# Patient Record
Sex: FEMALE | Race: WHITE | HISPANIC OR LATINO | Employment: FULL TIME | URBAN - METROPOLITAN AREA
[De-identification: names, ages, dates, MRNs, and addresses within clinical notes are randomized per-mention and may not be internally consistent; named-entity substitution may affect disease eponyms.]

---

## 2020-12-04 PROBLEM — Z20.822 SUSPECTED COVID-19 VIRUS INFECTION: Status: ACTIVE | Noted: 2020-12-04

## 2020-12-04 PROBLEM — Z20.822 SUSPECTED COVID-19 VIRUS INFECTION: Status: RESOLVED | Noted: 2020-12-04 | Resolved: 2020-12-04

## 2020-12-04 PROBLEM — R53.83 FATIGUE: Status: ACTIVE | Noted: 2020-12-04

## 2020-12-04 PROBLEM — R11.10 NON-INTRACTABLE VOMITING: Status: ACTIVE | Noted: 2020-12-04

## 2020-12-04 PROBLEM — R05.9 COUGH: Status: ACTIVE | Noted: 2020-12-04

## 2020-12-04 PROBLEM — E86.1 HYPOVOLEMIA: Status: ACTIVE | Noted: 2020-12-04

## 2020-12-04 PROBLEM — R51.9 NEW ONSET OF HEADACHES: Status: ACTIVE | Noted: 2020-12-04

## 2020-12-05 ENCOUNTER — HOSPITAL ENCOUNTER (OUTPATIENT)
Dept: RADIOLOGY | Facility: MEDICAL CENTER | Age: 30
End: 2020-12-05
Payer: COMMERCIAL

## 2020-12-05 ENCOUNTER — APPOINTMENT (OUTPATIENT)
Dept: RADIOLOGY | Facility: MEDICAL CENTER | Age: 30
DRG: 443 | End: 2020-12-05
Attending: HOSPITALIST
Payer: COMMERCIAL

## 2020-12-05 ENCOUNTER — HOSPITAL ENCOUNTER (INPATIENT)
Facility: MEDICAL CENTER | Age: 30
LOS: 4 days | DRG: 443 | End: 2020-12-09
Attending: EMERGENCY MEDICINE | Admitting: HOSPITALIST
Payer: COMMERCIAL

## 2020-12-05 DIAGNOSIS — K83.09 ASCENDING CHOLANGITIS: ICD-10-CM

## 2020-12-05 PROBLEM — K81.9 CHOLECYSTITIS: Status: ACTIVE | Noted: 2020-12-05

## 2020-12-05 PROBLEM — R74.01 TRANSAMINITIS: Status: ACTIVE | Noted: 2020-12-05

## 2020-12-05 PROBLEM — B15.9: Status: ACTIVE | Noted: 2020-12-05

## 2020-12-05 LAB
ALBUMIN SERPL BCP-MCNC: 3.1 G/DL (ref 3.2–4.9)
ALBUMIN/GLOB SERPL: 0.8 G/DL
ALP SERPL-CCNC: 115 U/L (ref 30–99)
ALT SERPL-CCNC: 1625 U/L (ref 2–50)
ANION GAP SERPL CALC-SCNC: 9 MMOL/L (ref 7–16)
AST SERPL-CCNC: 1842 U/L (ref 12–45)
BASOPHILS # BLD AUTO: 0.9 % (ref 0–1.8)
BASOPHILS # BLD: 0.04 K/UL (ref 0–0.12)
BILIRUB SERPL-MCNC: 6.3 MG/DL (ref 0.1–1.5)
BUN SERPL-MCNC: 7 MG/DL (ref 8–22)
CALCIUM SERPL-MCNC: 8.3 MG/DL (ref 8.5–10.5)
CHLORIDE SERPL-SCNC: 105 MMOL/L (ref 96–112)
CO2 SERPL-SCNC: 22 MMOL/L (ref 20–33)
CREAT SERPL-MCNC: 0.44 MG/DL (ref 0.5–1.4)
EOSINOPHIL # BLD AUTO: 0 K/UL (ref 0–0.51)
EOSINOPHIL NFR BLD: 0 % (ref 0–6.9)
ERYTHROCYTE [DISTWIDTH] IN BLOOD BY AUTOMATED COUNT: 45 FL (ref 35.9–50)
GLOBULIN SER CALC-MCNC: 3.9 G/DL (ref 1.9–3.5)
GLUCOSE SERPL-MCNC: 76 MG/DL (ref 65–99)
HCG SERPL QL: NEGATIVE
HCT VFR BLD AUTO: 38.3 % (ref 37–47)
HGB BLD-MCNC: 13.3 G/DL (ref 12–16)
LIPASE SERPL-CCNC: 13 U/L (ref 11–82)
LYMPHOCYTES # BLD AUTO: 0.84 K/UL (ref 1–4.8)
LYMPHOCYTES NFR BLD: 17.4 % (ref 22–41)
MANUAL DIFF BLD: NORMAL
MCH RBC QN AUTO: 30.9 PG (ref 27–33)
MCHC RBC AUTO-ENTMCNC: 34.7 G/DL (ref 33.6–35)
MCV RBC AUTO: 88.9 FL (ref 81.4–97.8)
MONOCYTES # BLD AUTO: 0.62 K/UL (ref 0–0.85)
MONOCYTES NFR BLD AUTO: 13 % (ref 0–13.4)
MORPHOLOGY BLD-IMP: NORMAL
NEUTROPHILS # BLD AUTO: 3.3 K/UL (ref 2–7.15)
NEUTROPHILS NFR BLD: 68.7 % (ref 44–72)
NRBC # BLD AUTO: 0 K/UL
NRBC BLD-RTO: 0 /100 WBC
PLATELET # BLD AUTO: 249 K/UL (ref 164–446)
PLATELET BLD QL SMEAR: NORMAL
PMV BLD AUTO: 9.3 FL (ref 9–12.9)
POTASSIUM SERPL-SCNC: 3.6 MMOL/L (ref 3.6–5.5)
PROT SERPL-MCNC: 7 G/DL (ref 6–8.2)
RBC # BLD AUTO: 4.31 M/UL (ref 4.2–5.4)
RBC BLD AUTO: NORMAL
SODIUM SERPL-SCNC: 136 MMOL/L (ref 135–145)
WBC # BLD AUTO: 4.8 K/UL (ref 4.8–10.8)

## 2020-12-05 PROCEDURE — 700105 HCHG RX REV CODE 258: Performed by: HOSPITALIST

## 2020-12-05 PROCEDURE — 84703 CHORIONIC GONADOTROPIN ASSAY: CPT

## 2020-12-05 PROCEDURE — 770006 HCHG ROOM/CARE - MED/SURG/GYN SEMI*

## 2020-12-05 PROCEDURE — 700111 HCHG RX REV CODE 636 W/ 250 OVERRIDE (IP): Performed by: HOSPITALIST

## 2020-12-05 PROCEDURE — 85027 COMPLETE CBC AUTOMATED: CPT

## 2020-12-05 PROCEDURE — U0003 INFECTIOUS AGENT DETECTION BY NUCLEIC ACID (DNA OR RNA); SEVERE ACUTE RESPIRATORY SYNDROME CORONAVIRUS 2 (SARS-COV-2) (CORONAVIRUS DISEASE [COVID-19]), AMPLIFIED PROBE TECHNIQUE, MAKING USE OF HIGH THROUGHPUT TECHNOLOGIES AS DESCRIBED BY CMS-2020-01-R: HCPCS

## 2020-12-05 PROCEDURE — 80053 COMPREHEN METABOLIC PANEL: CPT

## 2020-12-05 PROCEDURE — 83690 ASSAY OF LIPASE: CPT

## 2020-12-05 PROCEDURE — 96365 THER/PROPH/DIAG IV INF INIT: CPT

## 2020-12-05 PROCEDURE — 85007 BL SMEAR W/DIFF WBC COUNT: CPT

## 2020-12-05 PROCEDURE — 99223 1ST HOSP IP/OBS HIGH 75: CPT | Performed by: HOSPITALIST

## 2020-12-05 PROCEDURE — 99285 EMERGENCY DEPT VISIT HI MDM: CPT

## 2020-12-05 RX ORDER — AMOXICILLIN 250 MG
2 CAPSULE ORAL 2 TIMES DAILY
Status: DISCONTINUED | OUTPATIENT
Start: 2020-12-05 | End: 2020-12-09 | Stop reason: HOSPADM

## 2020-12-05 RX ORDER — SENNOSIDES 8.6 MG
650 CAPSULE ORAL EVERY 6 HOURS PRN
Status: ON HOLD | COMMUNITY
End: 2020-12-09

## 2020-12-05 RX ORDER — BISACODYL 10 MG
10 SUPPOSITORY, RECTAL RECTAL
Status: DISCONTINUED | OUTPATIENT
Start: 2020-12-05 | End: 2020-12-09 | Stop reason: HOSPADM

## 2020-12-05 RX ORDER — OXYCODONE HYDROCHLORIDE 5 MG/1
5 TABLET ORAL
Status: DISCONTINUED | OUTPATIENT
Start: 2020-12-05 | End: 2020-12-09 | Stop reason: HOSPADM

## 2020-12-05 RX ORDER — SODIUM CHLORIDE 9 MG/ML
INJECTION, SOLUTION INTRAVENOUS CONTINUOUS
Status: DISCONTINUED | OUTPATIENT
Start: 2020-12-05 | End: 2020-12-07

## 2020-12-05 RX ORDER — OXYCODONE HYDROCHLORIDE 10 MG/1
10 TABLET ORAL
Status: DISCONTINUED | OUTPATIENT
Start: 2020-12-05 | End: 2020-12-09 | Stop reason: HOSPADM

## 2020-12-05 RX ORDER — POLYETHYLENE GLYCOL 3350 17 G/17G
1 POWDER, FOR SOLUTION ORAL
Status: DISCONTINUED | OUTPATIENT
Start: 2020-12-05 | End: 2020-12-09 | Stop reason: HOSPADM

## 2020-12-05 RX ORDER — MORPHINE SULFATE 4 MG/ML
4 INJECTION, SOLUTION INTRAMUSCULAR; INTRAVENOUS
Status: DISCONTINUED | OUTPATIENT
Start: 2020-12-05 | End: 2020-12-09 | Stop reason: HOSPADM

## 2020-12-05 RX ORDER — IBUPROFEN 200 MG
800 TABLET ORAL
Status: ON HOLD | COMMUNITY
End: 2020-12-09

## 2020-12-05 RX ADMIN — PIPERACILLIN SODIUM, TAZOBACTAM SODIUM 3.38 G: 3; .375 INJECTION, POWDER, LYOPHILIZED, FOR SOLUTION INTRAVENOUS at 22:51

## 2020-12-05 ASSESSMENT — LIFESTYLE VARIABLES: DO YOU DRINK ALCOHOL: NO

## 2020-12-06 PROBLEM — U07.1 COVID-19 DETERMINED BY CLINICAL DIAGNOSTIC CRITERIA: Status: ACTIVE | Noted: 2020-12-06

## 2020-12-06 PROBLEM — K82.8 THICKENING OF WALL OF GALLBLADDER: Status: ACTIVE | Noted: 2020-12-05

## 2020-12-06 LAB
ALBUMIN SERPL BCP-MCNC: 3.1 G/DL (ref 3.2–4.9)
ALBUMIN/GLOB SERPL: 0.8 G/DL
ALP SERPL-CCNC: 113 U/L (ref 30–99)
ALT SERPL-CCNC: 1795 U/L (ref 2–50)
ANION GAP SERPL CALC-SCNC: 8 MMOL/L (ref 7–16)
ANISOCYTOSIS BLD QL SMEAR: ABNORMAL
AST SERPL-CCNC: 2262 U/L (ref 12–45)
BASOPHILS # BLD AUTO: 0 % (ref 0–1.8)
BASOPHILS # BLD: 0 K/UL (ref 0–0.12)
BILIRUB SERPL-MCNC: 6.6 MG/DL (ref 0.1–1.5)
BUN SERPL-MCNC: 8 MG/DL (ref 8–22)
BURR CELLS BLD QL SMEAR: NORMAL
CALCIUM SERPL-MCNC: 8.5 MG/DL (ref 8.5–10.5)
CHLORIDE SERPL-SCNC: 105 MMOL/L (ref 96–112)
CHOLEST SERPL-MCNC: 68 MG/DL (ref 100–199)
CO2 SERPL-SCNC: 23 MMOL/L (ref 20–33)
COVID ORDER STATUS COVID19: NORMAL
CREAT SERPL-MCNC: 0.44 MG/DL (ref 0.5–1.4)
EOSINOPHIL # BLD AUTO: 0 K/UL (ref 0–0.51)
EOSINOPHIL NFR BLD: 0 % (ref 0–6.9)
ERYTHROCYTE [DISTWIDTH] IN BLOOD BY AUTOMATED COUNT: 45.4 FL (ref 35.9–50)
FERRITIN SERPL-MCNC: 923 NG/ML (ref 10–291)
GLOBULIN SER CALC-MCNC: 3.8 G/DL (ref 1.9–3.5)
GLUCOSE SERPL-MCNC: 66 MG/DL (ref 65–99)
HAV IGM SERPL QL IA: REACTIVE
HCT VFR BLD AUTO: 39.1 % (ref 37–47)
HDLC SERPL-MCNC: 10 MG/DL
HGB BLD-MCNC: 13.5 G/DL (ref 12–16)
IRON SATN MFR SERPL: 23 % (ref 15–55)
IRON SERPL-MCNC: 49 UG/DL (ref 40–170)
LDLC SERPL CALC-MCNC: 36 MG/DL
LYMPHOCYTES # BLD AUTO: 0.74 K/UL (ref 1–4.8)
LYMPHOCYTES NFR BLD: 15.7 % (ref 22–41)
MACROCYTES BLD QL SMEAR: ABNORMAL
MAGNESIUM SERPL-MCNC: 1.9 MG/DL (ref 1.5–2.5)
MANUAL DIFF BLD: NORMAL
MCH RBC QN AUTO: 30.8 PG (ref 27–33)
MCHC RBC AUTO-ENTMCNC: 34.5 G/DL (ref 33.6–35)
MCV RBC AUTO: 89.1 FL (ref 81.4–97.8)
MONOCYTES # BLD AUTO: 0.41 K/UL (ref 0–0.85)
MONOCYTES NFR BLD AUTO: 8.7 % (ref 0–13.4)
MORPHOLOGY BLD-IMP: NORMAL
NEUTROPHILS # BLD AUTO: 3.56 K/UL (ref 2–7.15)
NEUTROPHILS NFR BLD: 74.8 % (ref 44–72)
NEUTS BAND NFR BLD MANUAL: 0.9 % (ref 0–10)
NRBC # BLD AUTO: 0 K/UL
NRBC BLD-RTO: 0 /100 WBC
PLATELET # BLD AUTO: 249 K/UL (ref 164–446)
PLATELET BLD QL SMEAR: NORMAL
PMV BLD AUTO: 9.2 FL (ref 9–12.9)
POIKILOCYTOSIS BLD QL SMEAR: NORMAL
POTASSIUM SERPL-SCNC: 3.8 MMOL/L (ref 3.6–5.5)
PROT SERPL-MCNC: 6.9 G/DL (ref 6–8.2)
RBC # BLD AUTO: 4.39 M/UL (ref 4.2–5.4)
RBC BLD AUTO: PRESENT
SARS-COV-2 RNA RESP QL NAA+PROBE: NOTDETECTED
SODIUM SERPL-SCNC: 136 MMOL/L (ref 135–145)
SPECIMEN SOURCE: NORMAL
TIBC SERPL-MCNC: 210 UG/DL (ref 250–450)
TRIGL SERPL-MCNC: 109 MG/DL (ref 0–149)
UIBC SERPL-MCNC: 161 UG/DL (ref 110–370)
WBC # BLD AUTO: 4.7 K/UL (ref 4.8–10.8)

## 2020-12-06 PROCEDURE — 80061 LIPID PANEL: CPT

## 2020-12-06 PROCEDURE — 700102 HCHG RX REV CODE 250 W/ 637 OVERRIDE(OP): Performed by: HOSPITALIST

## 2020-12-06 PROCEDURE — 86709 HEPATITIS A IGM ANTIBODY: CPT

## 2020-12-06 PROCEDURE — 80053 COMPREHEN METABOLIC PANEL: CPT

## 2020-12-06 PROCEDURE — 85007 BL SMEAR W/DIFF WBC COUNT: CPT

## 2020-12-06 PROCEDURE — 700105 HCHG RX REV CODE 258: Performed by: HOSPITALIST

## 2020-12-06 PROCEDURE — 36415 COLL VENOUS BLD VENIPUNCTURE: CPT

## 2020-12-06 PROCEDURE — 74181 MRI ABDOMEN W/O CONTRAST: CPT

## 2020-12-06 PROCEDURE — 83540 ASSAY OF IRON: CPT

## 2020-12-06 PROCEDURE — 83516 IMMUNOASSAY NONANTIBODY: CPT

## 2020-12-06 PROCEDURE — 700111 HCHG RX REV CODE 636 W/ 250 OVERRIDE (IP): Performed by: HOSPITALIST

## 2020-12-06 PROCEDURE — 83550 IRON BINDING TEST: CPT

## 2020-12-06 PROCEDURE — 85027 COMPLETE CBC AUTOMATED: CPT

## 2020-12-06 PROCEDURE — 83735 ASSAY OF MAGNESIUM: CPT

## 2020-12-06 PROCEDURE — 770021 HCHG ROOM/CARE - ISO PRIVATE

## 2020-12-06 PROCEDURE — 99233 SBSQ HOSP IP/OBS HIGH 50: CPT | Performed by: STUDENT IN AN ORGANIZED HEALTH CARE EDUCATION/TRAINING PROGRAM

## 2020-12-06 PROCEDURE — 82728 ASSAY OF FERRITIN: CPT

## 2020-12-06 PROCEDURE — A9270 NON-COVERED ITEM OR SERVICE: HCPCS | Performed by: HOSPITALIST

## 2020-12-06 PROCEDURE — 86039 ANTINUCLEAR ANTIBODIES (ANA): CPT

## 2020-12-06 RX ADMIN — PIPERACILLIN SODIUM, TAZOBACTAM SODIUM 3.38 G: 3; .375 INJECTION, POWDER, LYOPHILIZED, FOR SOLUTION INTRAVENOUS at 14:59

## 2020-12-06 RX ADMIN — SODIUM CHLORIDE: 9 INJECTION, SOLUTION INTRAVENOUS at 09:40

## 2020-12-06 RX ADMIN — DOCUSATE SODIUM 50 MG AND SENNOSIDES 8.6 MG 2 TABLET: 8.6; 5 TABLET, FILM COATED ORAL at 05:15

## 2020-12-06 RX ADMIN — SODIUM CHLORIDE: 9 INJECTION, SOLUTION INTRAVENOUS at 00:20

## 2020-12-06 RX ADMIN — PIPERACILLIN SODIUM, TAZOBACTAM SODIUM 3.38 G: 3; .375 INJECTION, POWDER, LYOPHILIZED, FOR SOLUTION INTRAVENOUS at 21:07

## 2020-12-06 RX ADMIN — SODIUM CHLORIDE: 9 INJECTION, SOLUTION INTRAVENOUS at 19:05

## 2020-12-06 RX ADMIN — PIPERACILLIN SODIUM, TAZOBACTAM SODIUM 3.38 G: 3; .375 INJECTION, POWDER, LYOPHILIZED, FOR SOLUTION INTRAVENOUS at 05:15

## 2020-12-06 SDOH — ECONOMIC STABILITY: FOOD INSECURITY: WITHIN THE PAST 12 MONTHS, THE FOOD YOU BOUGHT JUST DIDN'T LAST AND YOU DIDN'T HAVE MONEY TO GET MORE.: NEVER TRUE

## 2020-12-06 SDOH — HEALTH STABILITY: MENTAL HEALTH: HOW MANY STANDARD DRINKS CONTAINING ALCOHOL DO YOU HAVE ON A TYPICAL DAY?: 1 OR 2

## 2020-12-06 SDOH — HEALTH STABILITY: MENTAL HEALTH: HOW OFTEN DO YOU HAVE A DRINK CONTAINING ALCOHOL?: MONTHLY OR LESS

## 2020-12-06 SDOH — ECONOMIC STABILITY: FOOD INSECURITY: WITHIN THE PAST 12 MONTHS, YOU WORRIED THAT YOUR FOOD WOULD RUN OUT BEFORE YOU GOT MONEY TO BUY MORE.: NEVER TRUE

## 2020-12-06 SDOH — ECONOMIC STABILITY: TRANSPORTATION INSECURITY
IN THE PAST 12 MONTHS, HAS LACK OF TRANSPORTATION KEPT YOU FROM MEETINGS, WORK, OR FROM GETTING THINGS NEEDED FOR DAILY LIVING?: NO

## 2020-12-06 SDOH — ECONOMIC STABILITY: TRANSPORTATION INSECURITY
IN THE PAST 12 MONTHS, HAS THE LACK OF TRANSPORTATION KEPT YOU FROM MEDICAL APPOINTMENTS OR FROM GETTING MEDICATIONS?: NO

## 2020-12-06 ASSESSMENT — ENCOUNTER SYMPTOMS
VOMITING: 1
ROS SKIN COMMENTS: +JAUNDICE
ABDOMINAL PAIN: 1
HEADACHES: 1
CARDIOVASCULAR NEGATIVE: 1
PSYCHIATRIC NEGATIVE: 1
NAUSEA: 1
WEAKNESS: 1
COUGH: 1
RESPIRATORY NEGATIVE: 1
MYALGIAS: 1

## 2020-12-06 ASSESSMENT — COGNITIVE AND FUNCTIONAL STATUS - GENERAL
MOBILITY SCORE: 24
SUGGESTED CMS G CODE MODIFIER MOBILITY: CH
DAILY ACTIVITIY SCORE: 24
SUGGESTED CMS G CODE MODIFIER DAILY ACTIVITY: CH

## 2020-12-06 ASSESSMENT — PATIENT HEALTH QUESTIONNAIRE - PHQ9
SUM OF ALL RESPONSES TO PHQ9 QUESTIONS 1 AND 2: 0
1. LITTLE INTEREST OR PLEASURE IN DOING THINGS: NOT AT ALL
2. FEELING DOWN, DEPRESSED, IRRITABLE, OR HOPELESS: NOT AT ALL

## 2020-12-06 ASSESSMENT — PAIN DESCRIPTION - PAIN TYPE
TYPE: ACUTE PAIN

## 2020-12-06 ASSESSMENT — LIFESTYLE VARIABLES
EVER HAD A DRINK FIRST THING IN THE MORNING TO STEADY YOUR NERVES TO GET RID OF A HANGOVER: NO
TOTAL SCORE: 0
AVERAGE NUMBER OF DAYS PER WEEK YOU HAVE A DRINK CONTAINING ALCOHOL: 0.25
DOES PATIENT WANT TO STOP DRINKING: NO
HOW MANY TIMES IN THE PAST YEAR HAVE YOU HAD 5 OR MORE DRINKS IN A DAY: 2
TOTAL SCORE: 0
ALCOHOL_USE: YES
HAVE PEOPLE ANNOYED YOU BY CRITICIZING YOUR DRINKING: NO
EVER FELT BAD OR GUILTY ABOUT YOUR DRINKING: NO
ON A TYPICAL DAY WHEN YOU DRINK ALCOHOL HOW MANY DRINKS DO YOU HAVE: 2
TOTAL SCORE: 0
HAVE YOU EVER FELT YOU SHOULD CUT DOWN ON YOUR DRINKING: NO
CONSUMPTION TOTAL: POSITIVE

## 2020-12-06 ASSESSMENT — FIBROSIS 4 INDEX: FIB4 SCORE: 5.32

## 2020-12-06 NOTE — ED NOTES
Med rec completed per pt at bedside  Allergies reviewed - NKDA  No oral ABX in last 14 days     Pt denies taking any prescription medications

## 2020-12-06 NOTE — ASSESSMENT & PLAN NOTE
Feeling ill for > 1 week  No fever / chills + N/V  Elevated LFTs and T bili  Markedly thickened gallbladder wall  IgM - reactive

## 2020-12-06 NOTE — CARE PLAN
Problem: Pain Management  Goal: Pain level will decrease to patient's comfort goal  Outcome: PROGRESSING AS EXPECTED   Denies pain    Problem: Infection  Goal: Will remain free from infection  Outcome: PROGRESSING AS EXPECTED   Pt on IV zosyn

## 2020-12-06 NOTE — PROGRESS NOTES
Jin from Lab called with critical result of AST 2262 and ALT of 1795 at 0621. Critical lab result read back to Jin.   Dr. Hair notified of critical lab result at 0633.  Critical lab result read back by Dr. Hair. No new orders received at this time.

## 2020-12-06 NOTE — PROGRESS NOTES
Received bedside report from Lisa RN  Pt AAOx4  RANDLE  VSS  Denies pain  Denies SOB  -N/V; pt NPO  +BS +Flatus Last BM 12/4  Pt on IV zosyn  Pt up self w/ steady gait  POC discussed  Bed locked and in the lowest position  Call light w/in reach  Hourly rounding in place

## 2020-12-06 NOTE — PROGRESS NOTES
Pt is A&O 4  Pain well controlled at this time   Denies nausea  Pt currently NPO for planned procedure at this time. Awaiting scheduling  + Voids  + flatus  - BM  Up SBA  Bed alarm n/a, pt no fall risk per christophe hinojosa  Reviewed plan of care with patient, bed in lowest position and locked, pt resting comfortably now, call light within reach, all needs met at this time. Interventions will be executed per plan of care

## 2020-12-06 NOTE — H&P
Hospital Medicine History & Physical Note    Date of Service  12/5/2020    Primary Care Physician  Pcp Pt States None    Consultants  MD Maria M, GI    Code Status  Full Code    Chief Complaint  Chief Complaint   Patient presents with   • Abdominal Pain     Pt bib ems from [facility]  for an ERCP procedure.        History of Presenting Illness  30 y.o. female who presented 12/5/2020 with few days of worsening nausea and vomiting accompanied by headaches and myalgias.  Patient's also had a little bit of a cough for which she was concerned her entire constellation of symptoms could be attributable to COVID-19 as it is currently pandemic.  She is actually had several negative Covid swabs over the past 2 weeks, was evaluated in her doctor's office, routine labs were obtained showing a markedly elevated transaminase for which she was sent to the local emergency department up at [facility].  There is CT scan of the abdomen revealed marked gallbladder wall thickening.  Hepatitis serologies were drawn and significant for a positive hepatitis A.  She was seen by the general surgery resident, and transferred to our facility for ERCP.  Case was discussed with on-call GI Dr. Iraj estevez, who does recommend obtaining an MRCP prior, he will be by to see the patient in the morning.  Patient denies any prior episodes.  Acuity is acute, severity is severe, location is GI, timing is irrelevant, no particular alleviating or aggravating factors, course is progressive.    Review of Systems  All systems reviewed and negative except as noted per HPI.    Past Medical History   has a past medical history of Non-intractable vomiting (12/4/2020).    Surgical History   has no past surgical history on file.     Family History  family history includes Arthritis in her mother; Cancer in her paternal aunt and paternal grandfather; Diabetes in her father; Hypertension in her father and mother.     Social History   reports that she has never  smoked. She has never used smokeless tobacco. She reports current alcohol use. She reports that she does not use drugs.  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    Allergies  No Known Allergies    Medications  Prior to Admission Medications   Prescriptions Last Dose Informant Patient Reported? Taking?   acetaminophen (TYLENOL) 650 MG CR tablet 12/3/2020 at unknown Patient Yes Yes   Sig: Take 650 mg by mouth every 6 hours as needed for Moderate Pain.   fluticasone (FLONASE) 50 MCG/ACT nasal spray NOT TAKING at NOT TAKING Patient No No   Sig: Spray 1 Spray in nose every day. 1 spray into each nostril daily x21d   Patient not taking: Reported on 12/5/2020   ibuprofen (MOTRIN) 200 MG Tab 12/2/2020 at unknown Patient Yes Yes   Sig: Take 800 mg by mouth 1 time a day as needed for Mild Pain.      Facility-Administered Medications: None       Physical Exam  Temp:  [36.9 °C (98.4 °F)] 36.9 °C (98.4 °F)  Pulse:  [] 98  Resp:  [11-20] 14  BP: (101-117)/(55-82) 101/55  SpO2:  [90 %-94 %] 93 %    General: No acute distress  HEENT atraumatic, normocephalic, pupils equal round reactive to light  Neck: No JVD  Chest: Respirations are unlabored  Cardiac: Physiologic S1 and S2  Abdomen: Soft, exquisitely tender to the right upper quadrant, nondistended  Extremities: Without clubbing, cyanosis or edema  Neuro: Cranial nerves II through XII are grossly intact.  Psych: No anxiety, judgement intact.        Laboratory:  Recent Labs     12/04/20  1125 12/05/20  1213   WBC 5.2 4.5*   RBC 4.84 4.74   HEMOGLOBIN 14.8 14.5   HEMATOCRIT 42.5 41.6   MCV 87.8 87.8   MCH 30.6 30.6   MCHC 34.8 34.9   RDW 13.5 13.8   PLATELETCT 218 246   MPV 10.0 9.4     Recent Labs     12/04/20  1125 12/05/20  1213   SODIUM 138 137   POTASSIUM 3.7 3.6   CHLORIDE 102 102   CO2 23 25   GLUCOSE 105* 98   BUN 9 8   CREATININE 0.6 0.6   CALCIUM 9.1 9.2     Recent Labs     12/04/20  1125 12/05/20  1213   ALTSGPT 1699* 1904*   NEIDA 2012* 2354*    ALKPHOSPHAT 172* 156*   TBILIRUBIN 6.2* 6.5*   DBILIRUBIN  --  4.8*   LIPASE  --  26   GLUCOSE 105* 98     Recent Labs     12/05/20  1213   INR 1.23     No results for input(s): NTPROBNP in the last 72 hours.      No results for input(s): TROPONINT in the last 72 hours.    Imaging:  US-FOREIGN FILM ULTRASOUND   Final Result   Gallbladder wall was so diffusely thickened that it could not be initially definitively identified on ultrasound.  Presence confirmed only after CT scan per below.     OUTSIDE IMAGES-CT ABDOMEN /PELVIS   Final Result   Markedly thickened gallbladder wall.  Diffuse acute cholecystitis.  MRCP may provide additional information.    Images personally reviewed, sample image above.     [id number]    (Results Pending)   Ordered, results pending.      Assessment/Plan:  I anticipate this patient will require at least two midnights for appropriate medical management, necessitating inpatient admission.    Cholecystitis  Assessment & Plan  Patient will require an ERCP.  Dr. Graves [facility] contacted 12/5, 2100.  He requests MRCP, will see the patient in the AM.  Differential diagnosis does include an ascending cholangitis.  Patient will be permitted full liquid diet now, n.p.o. at midnight, IV hydration, antibiotics with Zosyn, she received a loading dose at [facility], will receive the extended infusion protocol here at [facility].    Transaminitis  Assessment & Plan  Likely due to hep A versus cholecystitis.  GI consult for consideration of ERCP    Hepatitis A test positive  Assessment & Plan  This may be playing a role in her cholecystitis.  Unclear if this is acute versus chronic.

## 2020-12-06 NOTE — CONSULTS
Gastroenterology Consult Note:    NILA Miller  Date & Time note created:    12/6/2020   9:08 AM     Referring MD:  Dr. Shan Reyes MD    Patient ID:  Name:             Krystal Edwards     YOB: 1990  Age:                 30 y.o.  female   MRN:               [id number]                                                             Reason for Consult:      Elevated LFT's  Nausea and vomiting x 9 days    History of Present Illness:    This is a 30-year-old female, with no significant past medical history, who presented to the emergency room with worsening nausea/vomiting, myalgias, headaches for the past 9 days.  The nausea and vomiting exacerbates after eating.  Also, she experienced bilateral knee and hip pain at the onset of symptoms.  She was concerned about Covid-which was negative.  Her PCP ran labs showing markedly elevated transaminase.  Evaluated at [facility]-CT scan of the abdomen revealed marked gallbladder wall thickening.  While there, she was evaluated by a general surgeon resident-who told her to come to [facility] for ERCP.  [facility] ER labs-CBC WNL, total bilirubin 6.2.  AST 2012, ALT 1699, alkaline phosphatase 172, lactic acid 1.3.  Hepatitis A positive. Hepatitis B and C-negative.  Infectious disease panel-negative.  Tylenol toxicity- <10.    Abdominal ultrasound 12/5/2020-gallbladder not visualized.  CBD 2 mm.    CT scan abdomen/pelvis 12/5/2020-markedly thickened gallbladder wall.  Diffuse acute cholecystitis.    MRCP 12/6/2020-severe edematous gallbladder with gallbladder wall thickening and collapsing of the lumen of unclear etiology.  No gallstone identified.  Normal CBD.  No CBD stone identified.    Hepatitis A reactive.  She denies any recent travel.  She went to [place] 1 year ago.  No changes in diet.  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    Denies any changes in medications.  No  "Tylenol use.  Alcohol 1-2 drinks per month.  Never heavy use.  No drug use.  Family members-strong history of gallbladder disease- 3 sisters and 1 brother.  No family history of liver disease.    Review of Systems:      Review of Systems   Constitutional: Positive for malaise/fatigue.   HENT: Negative.    Eyes:        +icteric   Respiratory: Negative.    Cardiovascular: Negative.    Gastrointestinal: Positive for abdominal pain, nausea and vomiting.   Genitourinary: Negative.    Musculoskeletal: Positive for joint pain and myalgias.   Skin: Negative for itching.        +jaundice   Neurological: Positive for weakness.   Psychiatric/Behavioral: Negative.              Physical Exam:  Vitals/ General Appearance:   Weight/BMI: Body mass index is 22.13 kg/m².    Vitals:    12/05/20 2251 12/06/20 0020 12/06/20 0036 12/06/20 0450   BP:  100/62  (!) 99/58   Pulse: (!) 104 93  95   Resp: 20 16  16   Temp:  36.6 °C (97.8 °F)  37.1 °C (98.8 °F)   TempSrc:  Temporal  Temporal   SpO2: 94% 93%  94%   Weight:   54.9 kg (121 lb)    Height:   1.575 m (5' 2\")      Oxygen Therapy:  Pulse Oximetry: 94 %, O2 (LPM): 0, O2 Delivery Device: None - Room Air    Physical Exam   Constitutional: She is oriented to person, place, and time and well-developed, well-nourished, and in no distress.   HENT:   Head: Normocephalic and atraumatic.   Eyes: Pupils are equal, round, and reactive to light. Scleral icterus is present.   Cardiovascular: Normal rate and regular rhythm.   Pulmonary/Chest: Effort normal and breath sounds normal.   Abdominal: Soft. Bowel sounds are normal. There is abdominal tenderness. There is guarding. There is no rebound.   Musculoskeletal: Normal range of motion.   Neurological: She is alert and oriented to person, place, and time.   Skin: Skin is warm and dry.   Psychiatric: Affect normal.       Past Medical History:   Past Medical History:   Diagnosis Date   • Non-intractable vomiting 12/4/2020       Past Surgical " History:  History reviewed. No pertinent surgical history.    Hospital Medications:    Current Facility-Administered Medications:   •  senna-docusate (PERICOLACE or SENOKOT S) 8.6-50 MG per tablet 2 Tab, 2 Tab, Oral, BID, 2 Tab at 12/06/20 0515 **AND** polyethylene glycol/lytes (MIRALAX) PACKET 1 Packet, 1 Packet, Oral, QDAY PRN **AND** magnesium hydroxide (MILK OF MAGNESIA) suspension 30 mL, 30 mL, Oral, QDAY PRN **AND** bisacodyl (DULCOLAX) suppository 10 mg, 10 mg, Rectal, QDAY PRN, Chivo Shaikh M.D.  •  NS infusion, , Intravenous, Continuous, Chivo Shaikh M.D., Last Rate: 100 mL/hr at 12/06/20 0020, New Bag at 12/06/20 0020  •  enoxaparin (LOVENOX) inj 40 mg, 40 mg, Subcutaneous, DAILY, Chivo Shaikh M.D., Stopped at 12/06/20 0900  •  Notify provider if pain remains uncontrolled, , , CONTINUOUS **AND** Use the numeric rating scale (NRS-11) on regular floors and Critical-Care Pain Observation Tool (CPOT) on ICUs/Trauma to assess pain, , , CONTINUOUS **AND** Pulse Ox (Oximetry), , , CONTINUOUS **AND** Pharmacy Consult Request ...Pain Management Review 1 Each, 1 Each, Other, PHARMACY TO DOSE **AND** If patient difficult to arouse and/or has respiratory depression, stop any opiates that are currently infusing and call a Rapid Response., , , CONTINUOUS **AND** oxyCODONE immediate-release (ROXICODONE) tablet 5 mg, 5 mg, Oral, Q3HRS PRN **AND** oxyCODONE immediate release (ROXICODONE) tablet 10 mg, 10 mg, Oral, Q3HRS PRN **AND** morphine (pf) 4 mg/mL injection 4 mg, 4 mg, Intravenous, Q3HRS PRN, Chivo Shaikh M.D.  •  piperacillin-tazobactam (ZOSYN) 3.375 g in  mL IVPB, 3.375 g, Intravenous, Q8HRS, Chivo Shaikh M.D., Last Rate: 25 mL/hr at 12/06/20 0515, 3.375 g at 12/06/20 0515    Current Outpatient Medications:  Current Facility-Administered Medications   Medication Dose Route Frequency Provider Last Rate Last Admin   • senna-docusate (PERICOLACE or SENOKOT S) 8.6-50 MG per tablet 2 Tab  2 Tab Oral  BID Chivo Shaikh M.D.   2 Tab at 12/06/20 0515    And   • polyethylene glycol/lytes (MIRALAX) PACKET 1 Packet  1 Packet Oral QDAY PRN Chivo Shaikh M.D.        And   • magnesium hydroxide (MILK OF MAGNESIA) suspension 30 mL  30 mL Oral QDAY PRN Chivo Shaikh M.D.        And   • bisacodyl (DULCOLAX) suppository 10 mg  10 mg Rectal QDAY PRN Cihvo Shaikh M.D.       • NS infusion   Intravenous Continuous Chivo Shaikh M.D. 100 mL/hr at 12/06/20 0020 New Bag at 12/06/20 0020   • enoxaparin (LOVENOX) inj 40 mg  40 mg Subcutaneous DAILY Chivo Shaikh M.D.   Stopped at 12/06/20 0900   • Pharmacy Consult Request ...Pain Management Review 1 Each  1 Each Other PHARMACY TO DOSE Chivo Shaikh M.D.        And   • oxyCODONE immediate-release (ROXICODONE) tablet 5 mg  5 mg Oral Q3HRS PRN Chivo Shaikh M.D.        And   • oxyCODONE immediate release (ROXICODONE) tablet 10 mg  10 mg Oral Q3HRS PRN Chivo Shaikh M.D.        And   • morphine (pf) 4 mg/mL injection 4 mg  4 mg Intravenous Q3HRS PRN Chivo Shaikh M.D.       • piperacillin-tazobactam (ZOSYN) 3.375 g in  mL IVPB  3.375 g Intravenous Q8HRS Chivo Shaikh M.D. 25 mL/hr at 12/06/20 0515 3.375 g at 12/06/20 0515       Medication Allergy:  No Known Allergies    Family History:  Family History   Problem Relation Age of Onset   • Arthritis Mother    • Hypertension Mother    • Diabetes Father    • Hypertension Father    • Cancer Paternal Aunt    • Cancer Paternal Grandfather        Social History:  Social History     Socioeconomic History   • Marital status: Single     Spouse name: Not on file   • Number of children: Not on file   • Years of education: Not on file   • Highest education level: Not on file   Occupational History   • Not on file   Social Needs   • Financial resource strain: Not on file   • Food insecurity     Worry: Never true     Inability: Never true   • Transportation needs     Medical: No     Non-medical: No   Tobacco Use   • Smoking  status: Never Smoker   • Smokeless tobacco: Never Used   Substance and Sexual Activity   • Alcohol use: Yes     Frequency: Monthly or less     Drinks per session: 1 or 2   • Drug use: Never   • Sexual activity: Not on file   Lifestyle   • Physical activity     Days per week: Not on file     Minutes per session: Not on file   • Stress: Not on file   Relationships   • Social connections     Talks on phone: Not on file     Gets together: Not on file     Attends Nondenominational service: Not on file     Active member of club or organization: Not on file     Attends meetings of clubs or organizations: Not on file     Relationship status: Not on file   • Intimate partner violence     Fear of current or ex partner: Not on file     Emotionally abused: Not on file     Physically abused: Not on file     Forced sexual activity: Not on file   Other Topics Concern   • Not on file   Social History Narrative    ** Merged History Encounter **              MDM (Data Review):     Records reviewed and summarized in current documentation    Lab Data Review:  Recent Results (from the past 24 hour(s))   ACETAMINOPHEN    Collection Time: 12/05/20 12:12 PM   Result Value Ref Range    Acetaminophen -Tylenol <10.0 ug/mL   CBC WITH DIFFERENTIAL    Collection Time: 12/05/20 12:13 PM   Result Value Ref Range    WBC 4.5 (L) 4.8 - 10.8 K/uL    RBC 4.74 4.20 - 5.40 M/uL    Hemoglobin 14.5 13.0 - 17.0 g/dL    Hematocrit 41.6 39.0 - 50.0 %    MCV 87.8 81.0 - 99.0 fL    MCH 30.6 28.4 - 32.7 pg    MCHC 34.9 33.0 - 37.0 g/dL    RDW 13.8 11.5 - 14.5 %    Platelet Count 246 130 - 400 K/uL    MPV 9.4 7.4 - 10.4 fL    Neutrophils Automated 63.7 39.0 - 70.0 %    Neutrophils-Polys 62 39 - 70 %    Lymphocytes Automated 20.8 (L) 21.0 - 50.0 %    Lymphocytes 22 21 - 50 %    Monocytes Automated 13.9 (H) 1.7 - 10.0 %    Monocytes 14 (H) 2 - 9 %    Eosinophils Automated 0.7 0.0 - 5.0 %    Basophils Automated 0.9 0.0 - 3.0 %    Abs Neutrophils Automated 2.8 1.8 - 7.7 K/uL     Neutrophils (Absolute) 2.9 1.8 - 7.7 K/uL    Abs Lymph Automated 0.9 (L) 1.2 - 4.8 K/uL    Monos (Absolute) 0.6 0.2 - 0.9 K/uL   Basic Metabolic Panel    Collection Time: 12/05/20 12:13 PM   Result Value Ref Range    Sodium 137 137 - 145 mmol/L    Potassium 3.6 3.5 - 5.1 mmol/L    Chloride 102 98 - 107 mmol/L    Co2 25 22 - 30 mmol/L    Glucose 98 70 - 100 mg/dL    Bun 8 7 - 17 mg/dL    Creatinine 0.6 0.6 - 1.0 mg/dL    Calcium 9.2 8.7 - 10.5 mg/dL    Anion Gap 10 4 - 12 mmol/L   HEPATIC FUNCTION PANEL    Collection Time: 12/05/20 12:13 PM   Result Value Ref Range    Alkaline Phosphatase 156 (H) 38 - 126 U/L    AST(SGOT) 2274 (H) 15 - 46 U/L    ALT(SGPT) 1904 (H) 13 - 69 U/L    Total Bilirubin 6.5 (H) 0.2 - 1.3 mg/dL    Direct Bilirubin 4.8 (H) 0.0 - 0.2 mg/dL    Indirect Bilirubin 1.7 (H) 0.1 - 0.9 mg/dL    Albumin 3.7 3.5 - 5.0 g/dL    Total Protein 8.1 6.3 - 8.2 g/dL   LIPASE    Collection Time: 12/05/20 12:13 PM   Result Value Ref Range    Lipase 26 23 - 300 U/L   Prothrombin Time    Collection Time: 12/05/20 12:13 PM   Result Value Ref Range    PT 13.0 (H) 9.3 - 12.4 sec    INR 1.23    HCG QUANTITATIVE    Collection Time: 12/05/20 12:13 PM   Result Value Ref Range    Bhcg <2 0 - 5 mIU/mL   HEPATITIS PANEL ACUTE(4 COMPONENTS)    Collection Time: 12/05/20 12:13 PM   Result Value Ref Range    Hepatitis B Surface Antigen Negative Negative    Hepatitis A Virus Ab, IgM Positive Negative    Hepatitis C Antibody Negative Negative   ESTIMATED GFR    Collection Time: 12/05/20 12:13 PM   Result Value Ref Range    GFR If African American >60 >60 mL/min/1.73 m 2    GFR If Non African American >60 >60 mL/min/1.73 m 2   MONONUCLEOSIS TEST QUAL    Collection Time: 12/05/20 12:13 PM   Result Value Ref Range    Heterophile Screen Negative Negative   CRP QUANTITIVE (NON-CARDIAC)    Collection Time: 12/05/20 12:13 PM   Result Value Ref Range    Stat C-Reactive Protein 0.7 0.0 - 0.9 mg/dL   Sed Rate    Collection Time: 12/05/20  12:13 PM   Result Value Ref Range    Sed Rate Westergren 44 (H) 0 - 20 mm/hour   DIFFERENTIAL MANUAL    Collection Time: 12/05/20 12:13 PM   Result Value Ref Range    Bands-Stabs 2 0 - 6 %    Plt Estimation Adequate    Influenza Rapid    Collection Time: 12/05/20 12:30 PM    Specimen: Respiratory; Respirate   Result Value Ref Range    Influenza A Ag Negative Negative    Influenza B Ag Negative Negative   COVID/SARS CoV-2 PCR RAPID    Collection Time: 12/05/20  3:00 PM    Specimen: Nasopharyngeal; Respirate   Result Value Ref Range    COVID Order Status See below    RESPIRATORY PANEL BY PCR    Collection Time: 12/05/20  3:00 PM   Result Value Ref Range    Adenovirus, PCR Not Detected Not Detected    Coronavirus 229E, PCR Not Detected Not Detected    Coronavirus HKU1, PCR Not Detected Not Detected    Coronavirus NL63, PCR Not Detected Not Detected    Coronavirus OC43, PCR Not Detected Not Detected    SARS-CoV-2 (COVID-19) RNA by JOSE JUAN Not Detected Not Detected    Influenza virus A RNA Not Detected Not Detected    Influenza virus A H1 RNA Not Detected Not Detected    Influenza virus A H3 RNA Not Detected Not Detected    Influenza A 2009, H1N1, PCR Not Detected Not Detected    Influenza virus B, PCR Not Detected Not Detected    Human Metapneumovirus, PCR Not Detected Not Detected    Rhinovirus / Enterovirus, PCR Not Detected Not Detected    Parainfluenza virus 1, PCR Not Detected Not Detected    Parainfluenza virus 2, PCR Not Detected Not Detected    Parainfluenza virus 3, PCR Not Detected Not Detected    Parainfluenza 4, PCR Not Detected Not Detected    RSV, PCR Not Detected Not Detected    B. pertussis, PCR Not Detected Not Detected    B. parapertussis, PCR Not Detected Not Detected    Chlamydia pneumoniae, PCR Not Detected Not Detected    Mycoplasma pneumoniae, PCR Not Detected Not Detected   CBC WITH DIFFERENTIAL    Collection Time: 12/05/20  8:08 PM   Result Value Ref Range    WBC 4.8 4.8 - 10.8 K/uL    RBC 4.31 4.20  - 5.40 M/uL    Hemoglobin 13.3 12.0 - 16.0 g/dL    Hematocrit 38.3 37.0 - 47.0 %    MCV 88.9 81.4 - 97.8 fL    MCH 30.9 27.0 - 33.0 pg    MCHC 34.7 33.6 - 35.0 g/dL    RDW 45.0 35.9 - 50.0 fL    Platelet Count 249 164 - 446 K/uL    MPV 9.3 9.0 - 12.9 fL    Neutrophils-Polys 68.70 44.00 - 72.00 %    Lymphocytes 17.40 (L) 22.00 - 41.00 %    Monocytes 13.00 0.00 - 13.40 %    Eosinophils 0.00 0.00 - 6.90 %    Basophils 0.90 0.00 - 1.80 %    Nucleated RBC 0.00 /100 WBC    Neutrophils (Absolute) 3.30 2.00 - 7.15 K/uL    Lymphs (Absolute) 0.84 (L) 1.00 - 4.80 K/uL    Monos (Absolute) 0.62 0.00 - 0.85 K/uL    Eos (Absolute) 0.00 0.00 - 0.51 K/uL    Baso (Absolute) 0.04 0.00 - 0.12 K/uL    NRBC (Absolute) 0.00 K/uL   COMP METABOLIC PANEL    Collection Time: 12/05/20  8:08 PM   Result Value Ref Range    Sodium 136 135 - 145 mmol/L    Potassium 3.6 3.6 - 5.5 mmol/L    Chloride 105 96 - 112 mmol/L    Co2 22 20 - 33 mmol/L    Anion Gap 9.0 7.0 - 16.0    Glucose 76 65 - 99 mg/dL    Bun 7 (L) 8 - 22 mg/dL    Creatinine 0.44 (L) 0.50 - 1.40 mg/dL    Calcium 8.3 (L) 8.5 - 10.5 mg/dL    AST(SGOT) 1842 (HH) 12 - 45 U/L    ALT(SGPT) 1625 (HH) 2 - 50 U/L    Alkaline Phosphatase 115 (H) 30 - 99 U/L    Total Bilirubin 6.3 (H) 0.1 - 1.5 mg/dL    Albumin 3.1 (L) 3.2 - 4.9 g/dL    Total Protein 7.0 6.0 - 8.2 g/dL    Globulin 3.9 (H) 1.9 - 3.5 g/dL    A-G Ratio 0.8 g/dL   LIPASE    Collection Time: 12/05/20  8:08 PM   Result Value Ref Range    Lipase 13 11 - 82 U/L   BETA-HCG QUALITATIVE SERUM    Collection Time: 12/05/20  8:08 PM   Result Value Ref Range    Beta-Hcg Qualitative Serum Negative Negative   MORPHOLOGY    Collection Time: 12/05/20  8:08 PM   Result Value Ref Range    RBC Morphology Normal    PERIPHERAL SMEAR REVIEW    Collection Time: 12/05/20  8:08 PM   Result Value Ref Range    Peripheral Smear Review see below    DIFFERENTIAL MANUAL    Collection Time: 12/05/20  8:08 PM   Result Value Ref Range    Manual Diff Status PERFORMED     PLATELET ESTIMATE    Collection Time: 12/05/20  8:08 PM   Result Value Ref Range    Plt Estimation Normal    ESTIMATED GFR    Collection Time: 12/05/20  8:08 PM   Result Value Ref Range    GFR If African American >60 >60 mL/min/1.73 m 2    GFR If Non African American >60 >60 mL/min/1.73 m 2   COVID/SARS CoV-2 PCR    Collection Time: 12/05/20 11:55 PM   Result Value Ref Range    COVID Order Status Received    SARS-CoV-2, PCR (In-House)    Collection Time: 12/05/20 11:55 PM   Result Value Ref Range    SARS-CoV-2 Source Nasal Swab    CBC with Differential    Collection Time: 12/06/20  5:19 AM   Result Value Ref Range    WBC 4.7 (L) 4.8 - 10.8 K/uL    RBC 4.39 4.20 - 5.40 M/uL    Hemoglobin 13.5 12.0 - 16.0 g/dL    Hematocrit 39.1 37.0 - 47.0 %    MCV 89.1 81.4 - 97.8 fL    MCH 30.8 27.0 - 33.0 pg    MCHC 34.5 33.6 - 35.0 g/dL    RDW 45.4 35.9 - 50.0 fL    Platelet Count 249 164 - 446 K/uL    MPV 9.2 9.0 - 12.9 fL    Neutrophils-Polys 74.80 (H) 44.00 - 72.00 %    Lymphocytes 15.70 (L) 22.00 - 41.00 %    Monocytes 8.70 0.00 - 13.40 %    Eosinophils 0.00 0.00 - 6.90 %    Basophils 0.00 0.00 - 1.80 %    Nucleated RBC 0.00 /100 WBC    Neutrophils (Absolute) 3.56 2.00 - 7.15 K/uL    Lymphs (Absolute) 0.74 (L) 1.00 - 4.80 K/uL    Monos (Absolute) 0.41 0.00 - 0.85 K/uL    Eos (Absolute) 0.00 0.00 - 0.51 K/uL    Baso (Absolute) 0.00 0.00 - 0.12 K/uL    NRBC (Absolute) 0.00 K/uL    Anisocytosis 1+     Macrocytosis 1+    Comp Metabolic Panel (CMP)    Collection Time: 12/06/20  5:19 AM   Result Value Ref Range    Sodium 136 135 - 145 mmol/L    Potassium 3.8 3.6 - 5.5 mmol/L    Chloride 105 96 - 112 mmol/L    Co2 23 20 - 33 mmol/L    Anion Gap 8.0 7.0 - 16.0    Glucose 66 65 - 99 mg/dL    Bun 8 8 - 22 mg/dL    Creatinine 0.44 (L) 0.50 - 1.40 mg/dL    Calcium 8.5 8.5 - 10.5 mg/dL    AST(SGOT) 2262 (HH) 12 - 45 U/L    ALT(SGPT) 1795 (HH) 2 - 50 U/L    Alkaline Phosphatase 113 (H) 30 - 99 U/L    Total Bilirubin 6.6 (H) 0.1 - 1.5  mg/dL    Albumin 3.1 (L) 3.2 - 4.9 g/dL    Total Protein 6.9 6.0 - 8.2 g/dL    Globulin 3.8 (H) 1.9 - 3.5 g/dL    A-G Ratio 0.8 g/dL   Lipid Profile (Lipid Panel) Fasting    Collection Time: 12/06/20  5:19 AM   Result Value Ref Range    Cholesterol,Tot 68 (L) 100 - 199 mg/dL    Triglycerides 109 0 - 149 mg/dL    HDL 10 (A) >=40 mg/dL    LDL 36 <100 mg/dL   Magnesium    Collection Time: 12/06/20  5:19 AM   Result Value Ref Range    Magnesium 1.9 1.5 - 2.5 mg/dL   ESTIMATED GFR    Collection Time: 12/06/20  5:19 AM   Result Value Ref Range    GFR If African American >60 >60 mL/min/1.73 m 2    GFR If Non African American >60 >60 mL/min/1.73 m 2   DIFFERENTIAL MANUAL    Collection Time: 12/06/20  5:19 AM   Result Value Ref Range    Bands-Stabs 0.90 0.00 - 10.00 %    Manual Diff Status PERFORMED    PERIPHERAL SMEAR REVIEW    Collection Time: 12/06/20  5:19 AM   Result Value Ref Range    Peripheral Smear Review see below    PLATELET ESTIMATE    Collection Time: 12/06/20  5:19 AM   Result Value Ref Range    Plt Estimation Normal    MORPHOLOGY    Collection Time: 12/06/20  5:19 AM   Result Value Ref Range    RBC Morphology Present     Poikilocytosis 2+     Echinocytes 2+        Imaging/Procedures Review:      [id number]   Final Result         Severe edematous gallbladder with gallbladder wall thickening and collapsing of the lumen of unclear etiology. No gallstone identified.      Normal CBD. No CBD stone identified.      US-FOREIGN FILM ULTRASOUND   Final Result      OUTSIDE IMAGES-CT ABDOMEN /PELVIS   Final Result           MDM (Assessment and Plan):     Patient Active Problem List    Diagnosis Date Noted   • Cholecystitis 12/05/2020     Priority: High   • COVID-19 determined by clinical diagnostic criteria 12/06/2020   • Hepatitis A test positive 12/05/2020   • Transaminitis 12/05/2020   • Non-intractable vomiting 12/04/2020   • Hypovolemia 12/04/2020   • Fatigue 12/04/2020   • New onset of headaches 12/04/2020   •  Cough 12/04/2020   • Suspected COVID-19 virus infection 12/04/2020       This is a 30-year-old healthy female that began to have arthralgias, myalgias, postprandial nausea/vomiting, headaches 9 days ago.  She thought she had Covid-which was negative.  Labs revealed markedly elevated transaminase.  Hepatitis A-positive.  MRCP revealed severe edematous gallbladder with gallbladder wall thickening.  No evidence of choledocholithiasis.      ASSESSMENT:    -NAUSEA/VOMITING: Symptoms are exacerbated after eating.  No abdominal pain except with palpation.  Findings of cholecystitis on CT scan and MRCP.    Elevated transaminase-MRCP negative for choledocholithiasis. 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  She has a strong family history of gallbladder disease.  Will need to evaluate for autoimmune versus acute hepatitis A infection, hemochromatosis-fairly low. Appears her elevated transaminase is due to cholecystitis. Recommend surgical evaluation for cholecystectomy.     Hepatitis A positive-will need to rule out acute phase.  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  No recent travel.  No sick contacts.      PLAN:  -Clear liquid diet  -Hepatitis A IgM  -ANISHA, ASMA, AMA, TIBC, ferritin, IgG  -continue to trend LFTs  -Continue IV Zosyn  --Surgical consult for possible cholecystectomy  -No findings of choledocholithiasis, hold off on ERCP        Thank your for the opportunity to assist in the care of your patient.  Please call for any questions or concerns.    MARIANNE Miller.

## 2020-12-06 NOTE — ED PROVIDER NOTES
ED Provider Note    CHIEF COMPLAINT  Chief Complaint   Patient presents with   • Abdominal Pain     Pt bib ems from [facility]  for an ERCP procedure.        HPI  Krystal Gee is a 30 y.o. female who presents to the emergency department for further evaluation of possible ascending cholangitis. Past medical history is largely benign. Patient started feeling well functioning one half weeks ago. She felt that she may have covered you with Dr. she had generalized fatigue, abdominal pain and nausea and vomiting. However over the last 17 days she is now had three negative cover test. Due to ongoing recurrent negativity on this testing provider ultimately get further evaluation with ultimately found elevated LFTs significant elevated bilirubin and concern for possible ascending cholangitis was very edematous gallbladder on imaging. Patient has reduced facility for possible ERCP [facility] consultation. Patient is currently resting comfortably. Pain is mild. Only aggravating factor of the pain is physical exam. Denies any diarrhea.    REVIEW OF SYSTEMS  See HPI for further details. All other systems are negative.     PAST MEDICAL HISTORY   has a past medical history of Non-intractable vomiting (12/4/2020).    SOCIAL HISTORY  Social History     Tobacco Use   • Smoking status: Never Smoker   • Smokeless tobacco: Never Used   Substance and Sexual Activity   • Alcohol use: Yes     Frequency: Monthly or less     Drinks per session: 1 or 2   • Drug use: Never   • Sexual activity: Not on file       SURGICAL HISTORY  patient denies any surgical history    CURRENT MEDICATIONS  Home Medications     Reviewed by Lisa Jin R.N. (Registered Nurse) on 12/06/20 at 0031  Med List Status: Complete   Medication Last Dose Status   acetaminophen (TYLENOL) 650 MG CR tablet 12/3/2020 Active   fluticasone (FLONASE) 50 MCG/ACT nasal spray NOT TAKING Active   ibuprofen (MOTRIN) 200 MG Tab 12/2/2020 Active                ALLERGIES  No Known  "Allergies    PHYSICAL EXAM  VITAL SIGNS: BP (!) 98/68 Comment: RN has been notified.  Pulse (!) 102 Comment: RN has been notified.  Temp 36.4 °C (97.6 °F) (Temporal)   Resp 17   Ht 1.575 m (5' 2\")   Wt 54.9 kg (121 lb)   LMP 12/01/2020 (Exact Date)   SpO2 96%   BMI 22.13 kg/m²  @AIDA[[id number]::@   Pulse ox interpretation: I interpret this pulse ox as normal.  Constitutional: Alert in no apparent distress.  HENT: No signs of trauma, Bilateral external ears normal, Nose normal.   Eyes: Pupils are equal and reactive, slight scleral icterus  Neck: Normal range of motion, No tenderness, Supple  Cardiovascular: Regular rate and rhythm, no murmurs.   Thorax & Lungs: Normal breath sounds, No respiratory distress, No wheezing, No chest tenderness.   Abdomen: Bowel sounds normal, Soft, mild mid epigastric right upper quadrant tenderness  Skin: Warm, Dry, No erythema, No rash.    Extremities: Intact distal pulses, No edema, No tenderness  Musculoskeletal: Good range of motion in all major joints.   Neurologic: Alert , Normal motor function, Normal sensory function, No focal deficits noted.   Psychiatric: Affect normal, Judgment normal, Mood normal.       DIAGNOSTIC STUDIES / PROCEDURES      LABS  Results for orders placed or performed during the hospital encounter of 12/05/20   CBC WITH DIFFERENTIAL   Result Value Ref Range    WBC 4.8 4.8 - 10.8 K/uL    RBC 4.31 4.20 - 5.40 M/uL    Hemoglobin 13.3 12.0 - 16.0 g/dL    Hematocrit 38.3 37.0 - 47.0 %    MCV 88.9 81.4 - 97.8 fL    MCH 30.9 27.0 - 33.0 pg    MCHC 34.7 33.6 - 35.0 g/dL    RDW 45.0 35.9 - 50.0 fL    Platelet Count 249 164 - 446 K/uL    MPV 9.3 9.0 - 12.9 fL    Neutrophils-Polys 68.70 44.00 - 72.00 %    Lymphocytes 17.40 (L) 22.00 - 41.00 %    Monocytes 13.00 0.00 - 13.40 %    Eosinophils 0.00 0.00 - 6.90 %    Basophils 0.90 0.00 - 1.80 %    Nucleated RBC 0.00 /100 WBC    Neutrophils (Absolute) 3.30 2.00 - 7.15 K/uL    Lymphs (Absolute) 0.84 (L) 1.00 - 4.80 K/uL "    Monos (Absolute) 0.62 0.00 - 0.85 K/uL    Eos (Absolute) 0.00 0.00 - 0.51 K/uL    Baso (Absolute) 0.04 0.00 - 0.12 K/uL    NRBC (Absolute) 0.00 K/uL   COMP METABOLIC PANEL   Result Value Ref Range    Sodium 136 135 - 145 mmol/L    Potassium 3.6 3.6 - 5.5 mmol/L    Chloride 105 96 - 112 mmol/L    Co2 22 20 - 33 mmol/L    Anion Gap 9.0 7.0 - 16.0    Glucose 76 65 - 99 mg/dL    Bun 7 (L) 8 - 22 mg/dL    Creatinine 0.44 (L) 0.50 - 1.40 mg/dL    Calcium 8.3 (L) 8.5 - 10.5 mg/dL    AST(SGOT) 1842 (HH) 12 - 45 U/L    ALT(SGPT) 1625 (HH) 2 - 50 U/L    Alkaline Phosphatase 115 (H) 30 - 99 U/L    Total Bilirubin 6.3 (H) 0.1 - 1.5 mg/dL    Albumin 3.1 (L) 3.2 - 4.9 g/dL    Total Protein 7.0 6.0 - 8.2 g/dL    Globulin 3.9 (H) 1.9 - 3.5 g/dL    A-G Ratio 0.8 g/dL   LIPASE   Result Value Ref Range    Lipase 13 11 - 82 U/L   BETA-HCG QUALITATIVE SERUM   Result Value Ref Range    Beta-Hcg Qualitative Serum Negative Negative   MORPHOLOGY   Result Value Ref Range    RBC Morphology Normal    PERIPHERAL SMEAR REVIEW   Result Value Ref Range    Peripheral Smear Review see below    DIFFERENTIAL MANUAL   Result Value Ref Range    Manual Diff Status PERFORMED    PLATELET ESTIMATE   Result Value Ref Range    Plt Estimation Normal    ESTIMATED GFR   Result Value Ref Range    GFR If African American >60 >60 mL/min/1.73 m 2    GFR If Non African American >60 >60 mL/min/1.73 m 2   CBC with Differential   Result Value Ref Range    WBC 4.7 (L) 4.8 - 10.8 K/uL    RBC 4.39 4.20 - 5.40 M/uL    Hemoglobin 13.5 12.0 - 16.0 g/dL    Hematocrit 39.1 37.0 - 47.0 %    MCV 89.1 81.4 - 97.8 fL    MCH 30.8 27.0 - 33.0 pg    MCHC 34.5 33.6 - 35.0 g/dL    RDW 45.4 35.9 - 50.0 fL    Platelet Count 249 164 - 446 K/uL    MPV 9.2 9.0 - 12.9 fL    Neutrophils-Polys 74.80 (H) 44.00 - 72.00 %    Lymphocytes 15.70 (L) 22.00 - 41.00 %    Monocytes 8.70 0.00 - 13.40 %    Eosinophils 0.00 0.00 - 6.90 %    Basophils 0.00 0.00 - 1.80 %    Nucleated RBC 0.00 /100 WBC     Neutrophils (Absolute) 3.56 2.00 - 7.15 K/uL    Lymphs (Absolute) 0.74 (L) 1.00 - 4.80 K/uL    Monos (Absolute) 0.41 0.00 - 0.85 K/uL    Eos (Absolute) 0.00 0.00 - 0.51 K/uL    Baso (Absolute) 0.00 0.00 - 0.12 K/uL    NRBC (Absolute) 0.00 K/uL    Anisocytosis 1+     Macrocytosis 1+    Comp Metabolic Panel (CMP)   Result Value Ref Range    Sodium 136 135 - 145 mmol/L    Potassium 3.8 3.6 - 5.5 mmol/L    Chloride 105 96 - 112 mmol/L    Co2 23 20 - 33 mmol/L    Anion Gap 8.0 7.0 - 16.0    Glucose 66 65 - 99 mg/dL    Bun 8 8 - 22 mg/dL    Creatinine 0.44 (L) 0.50 - 1.40 mg/dL    Calcium 8.5 8.5 - 10.5 mg/dL    AST(SGOT) 2262 (HH) 12 - 45 U/L    ALT(SGPT) 1795 (HH) 2 - 50 U/L    Alkaline Phosphatase 113 (H) 30 - 99 U/L    Total Bilirubin 6.6 (H) 0.1 - 1.5 mg/dL    Albumin 3.1 (L) 3.2 - 4.9 g/dL    Total Protein 6.9 6.0 - 8.2 g/dL    Globulin 3.8 (H) 1.9 - 3.5 g/dL    A-G Ratio 0.8 g/dL   Lipid Profile (Lipid Panel) Fasting   Result Value Ref Range    Cholesterol,Tot 68 (L) 100 - 199 mg/dL    Triglycerides 109 0 - 149 mg/dL    HDL 10 (A) >=40 mg/dL    LDL 36 <100 mg/dL   Magnesium   Result Value Ref Range    Magnesium 1.9 1.5 - 2.5 mg/dL   COVID/SARS CoV-2 PCR   Result Value Ref Range    COVID Order Status Received    SARS-CoV-2, PCR (In-House)   Result Value Ref Range    SARS-CoV-2 Source Nasal Swab     SARS-CoV-2 by PCR NotDetected    ESTIMATED GFR   Result Value Ref Range    GFR If African American >60 >60 mL/min/1.73 m 2    GFR If Non African American >60 >60 mL/min/1.73 m 2   DIFFERENTIAL MANUAL   Result Value Ref Range    Bands-Stabs 0.90 0.00 - 10.00 %    Manual Diff Status PERFORMED    PERIPHERAL SMEAR REVIEW   Result Value Ref Range    Peripheral Smear Review see below    PLATELET ESTIMATE   Result Value Ref Range    Plt Estimation Normal    MORPHOLOGY   Result Value Ref Range    RBC Morphology Present     Poikilocytosis 2+     Echinocytes 2+    HEP A AB IGM   Result Value Ref Range    Hepatitis A Virus Ab, IgM  Reactive (A) Non-Reactive   FERRITIN   Result Value Ref Range    Ferritin 923.0 (H) 10.0 - 291.0 ng/mL   IRON/TOTAL IRON BIND   Result Value Ref Range    Iron 49 40 - 170 ug/dL    Total Iron Binding 210 (L) 250 - 450 ug/dL    Unsat Iron Binding 161 110 - 370 ug/dL    % Saturation 23 15 - 55 %         RADIOLOGY  [id number]   Final Result         Severe edematous gallbladder with gallbladder wall thickening and collapsing of the lumen of unclear etiology. No gallstone identified.      Normal CBD. No CBD stone identified.      US-FOREIGN FILM ULTRASOUND   Final Result      OUTSIDE IMAGES-CT ABDOMEN /PELVIS   Final Result              COURSE & MEDICAL DECISION MAKING  Pertinent Labs & Imaging studies reviewed. (See chart for details)  patient present emergency room with abnormal labs from [facility] facility. Patient requiring need for further hepatobiliary evaluation. Patient has been kept NPO and started on IV fluids and maintenance without significant clinical change here. I consulted with hospice which will further consult with G.I. in the morning. Patient currently resting comfortably.    FINAL IMPRESSION  1. Ascending cholangitis            Electronically signed by: Harpreet Khan M.D., 12/5/2020 8:35 PM

## 2020-12-07 LAB
ALBUMIN SERPL BCP-MCNC: 2.8 G/DL (ref 3.2–4.9)
ALBUMIN/GLOB SERPL: 0.8 G/DL
ALP SERPL-CCNC: 101 U/L (ref 30–99)
ALT SERPL-CCNC: 1800 U/L (ref 2–50)
ANION GAP SERPL CALC-SCNC: 8 MMOL/L (ref 7–16)
ANISOCYTOSIS BLD QL SMEAR: ABNORMAL
AST SERPL-CCNC: 1976 U/L (ref 12–45)
BASOPHILS # BLD AUTO: 0 % (ref 0–1.8)
BASOPHILS # BLD: 0 K/UL (ref 0–0.12)
BILIRUB SERPL-MCNC: 7.2 MG/DL (ref 0.1–1.5)
BUN SERPL-MCNC: 4 MG/DL (ref 8–22)
BURR CELLS BLD QL SMEAR: NORMAL
CALCIUM SERPL-MCNC: 8.2 MG/DL (ref 8.5–10.5)
CHLORIDE SERPL-SCNC: 107 MMOL/L (ref 96–112)
CO2 SERPL-SCNC: 23 MMOL/L (ref 20–33)
CREAT SERPL-MCNC: 0.51 MG/DL (ref 0.5–1.4)
EOSINOPHIL # BLD AUTO: 0 K/UL (ref 0–0.51)
EOSINOPHIL NFR BLD: 0 % (ref 0–6.9)
ERYTHROCYTE [DISTWIDTH] IN BLOOD BY AUTOMATED COUNT: 45 FL (ref 35.9–50)
FERRITIN SERPL-MCNC: 663 NG/ML (ref 10–291)
GLOBULIN SER CALC-MCNC: 3.6 G/DL (ref 1.9–3.5)
GLUCOSE SERPL-MCNC: 92 MG/DL (ref 65–99)
HBV CORE AB SERPL QL IA: NONREACTIVE
HBV SURFACE AB SERPL IA-ACNC: 3992 MIU/ML (ref 0–10)
HCT VFR BLD AUTO: 38 % (ref 37–47)
HGB BLD-MCNC: 12.7 G/DL (ref 12–16)
IRON SATN MFR SERPL: 48 % (ref 15–55)
IRON SERPL-MCNC: 96 UG/DL (ref 40–170)
LYMPHOCYTES # BLD AUTO: 0.88 K/UL (ref 1–4.8)
LYMPHOCYTES NFR BLD: 20.9 % (ref 22–41)
MACROCYTES BLD QL SMEAR: ABNORMAL
MANUAL DIFF BLD: NORMAL
MCH RBC QN AUTO: 29.7 PG (ref 27–33)
MCHC RBC AUTO-ENTMCNC: 33.4 G/DL (ref 33.6–35)
MCV RBC AUTO: 89 FL (ref 81.4–97.8)
MICROCYTES BLD QL SMEAR: ABNORMAL
MONOCYTES # BLD AUTO: 0.47 K/UL (ref 0–0.85)
MONOCYTES NFR BLD AUTO: 11.3 % (ref 0–13.4)
MORPHOLOGY BLD-IMP: NORMAL
NEUTROPHILS # BLD AUTO: 2.85 K/UL (ref 2–7.15)
NEUTROPHILS NFR BLD: 67.8 % (ref 44–72)
NRBC # BLD AUTO: 0 K/UL
NRBC BLD-RTO: 0 /100 WBC
OVALOCYTES BLD QL SMEAR: NORMAL
PLATELET # BLD AUTO: 270 K/UL (ref 164–446)
PLATELET BLD QL SMEAR: NORMAL
PMV BLD AUTO: 9.3 FL (ref 9–12.9)
POLYCHROMASIA BLD QL SMEAR: NORMAL
POTASSIUM SERPL-SCNC: 3.7 MMOL/L (ref 3.6–5.5)
PROT SERPL-MCNC: 6.4 G/DL (ref 6–8.2)
RBC # BLD AUTO: 4.27 M/UL (ref 4.2–5.4)
RBC BLD AUTO: PRESENT
SODIUM SERPL-SCNC: 138 MMOL/L (ref 135–145)
TIBC SERPL-MCNC: 200 UG/DL (ref 250–450)
UIBC SERPL-MCNC: 104 UG/DL (ref 110–370)
WBC # BLD AUTO: 4.2 K/UL (ref 4.8–10.8)

## 2020-12-07 PROCEDURE — 83540 ASSAY OF IRON: CPT

## 2020-12-07 PROCEDURE — 99232 SBSQ HOSP IP/OBS MODERATE 35: CPT | Performed by: STUDENT IN AN ORGANIZED HEALTH CARE EDUCATION/TRAINING PROGRAM

## 2020-12-07 PROCEDURE — 83550 IRON BINDING TEST: CPT

## 2020-12-07 PROCEDURE — 86706 HEP B SURFACE ANTIBODY: CPT

## 2020-12-07 PROCEDURE — 700102 HCHG RX REV CODE 250 W/ 637 OVERRIDE(OP): Performed by: HOSPITALIST

## 2020-12-07 PROCEDURE — 85027 COMPLETE CBC AUTOMATED: CPT

## 2020-12-07 PROCEDURE — 36415 COLL VENOUS BLD VENIPUNCTURE: CPT

## 2020-12-07 PROCEDURE — 80053 COMPREHEN METABOLIC PANEL: CPT

## 2020-12-07 PROCEDURE — 700111 HCHG RX REV CODE 636 W/ 250 OVERRIDE (IP): Performed by: HOSPITALIST

## 2020-12-07 PROCEDURE — A9270 NON-COVERED ITEM OR SERVICE: HCPCS | Performed by: HOSPITALIST

## 2020-12-07 PROCEDURE — 700105 HCHG RX REV CODE 258: Performed by: HOSPITALIST

## 2020-12-07 PROCEDURE — 85007 BL SMEAR W/DIFF WBC COUNT: CPT

## 2020-12-07 PROCEDURE — 86704 HEP B CORE ANTIBODY TOTAL: CPT

## 2020-12-07 PROCEDURE — 83516 IMMUNOASSAY NONANTIBODY: CPT

## 2020-12-07 PROCEDURE — 86038 ANTINUCLEAR ANTIBODIES: CPT

## 2020-12-07 PROCEDURE — 770021 HCHG ROOM/CARE - ISO PRIVATE

## 2020-12-07 PROCEDURE — 82728 ASSAY OF FERRITIN: CPT

## 2020-12-07 RX ADMIN — PIPERACILLIN SODIUM, TAZOBACTAM SODIUM 3.38 G: 3; .375 INJECTION, POWDER, LYOPHILIZED, FOR SOLUTION INTRAVENOUS at 12:51

## 2020-12-07 RX ADMIN — ENOXAPARIN SODIUM 40 MG: 40 INJECTION SUBCUTANEOUS at 05:52

## 2020-12-07 RX ADMIN — SODIUM CHLORIDE: 9 INJECTION, SOLUTION INTRAVENOUS at 05:53

## 2020-12-07 RX ADMIN — PIPERACILLIN SODIUM, TAZOBACTAM SODIUM 3.38 G: 3; .375 INJECTION, POWDER, LYOPHILIZED, FOR SOLUTION INTRAVENOUS at 21:02

## 2020-12-07 RX ADMIN — PIPERACILLIN SODIUM, TAZOBACTAM SODIUM 3.38 G: 3; .375 INJECTION, POWDER, LYOPHILIZED, FOR SOLUTION INTRAVENOUS at 05:52

## 2020-12-07 RX ADMIN — DOCUSATE SODIUM 50 MG AND SENNOSIDES 8.6 MG 2 TABLET: 8.6; 5 TABLET, FILM COATED ORAL at 17:10

## 2020-12-07 ASSESSMENT — ENCOUNTER SYMPTOMS
NAUSEA: 1
GASTROINTESTINAL NEGATIVE: 1
NEUROLOGICAL NEGATIVE: 1
VOMITING: 1
CARDIOVASCULAR NEGATIVE: 1
HEADACHES: 1
RESPIRATORY NEGATIVE: 1
MUSCULOSKELETAL NEGATIVE: 1
MYALGIAS: 1
EYES NEGATIVE: 1
PSYCHIATRIC NEGATIVE: 1
COUGH: 1

## 2020-12-07 ASSESSMENT — PAIN DESCRIPTION - PAIN TYPE: TYPE: ACUTE PAIN

## 2020-12-07 NOTE — CONSULTS
DATE OF CONSULTATION:  12/6/2020     REFERRING PHYSICIAN:      Shan Reyes M.D.     CONSULTING PHYSICIAN:  Rudolph Herman M.D.      REASON FOR CONSULTATION:  Elevated LFTs / enlarged gallbladder.   I have been asked by  to see the patient in surgical consultation for evaluation of elevated LFTs and thickened gallbaladder.     HISTORY OF PRESENT ILLNESS: The patient is a 30 year-old  young woman who presents to the Emergency Department with a with more than a wek of worsening nausea and vomiting accompanied by headaches and myalgias.  Patient's also had a little bit of a cough - she is actually had several negative Covid swabs over the past 2 weeks.  She was evaluated in her doctor's office where labs were obtained showing a markedly elevated transaminases and elevated T bili for which she was sent to the local emergency department up at [facility].  There is CT scan of the abdomen revealed marked gallbladder wall thickening.  Hepatitis serologies were drawn and significant for a positive hepatitis A.  She was  transferred to our facility for an ERCP.     PAST MEDICAL HISTORY:  has a past medical history of Non-intractable vomiting (12/4/2020).    PAST SURGICAL HISTORY: patient denies any surgical history     ALLERGIES: No Known Allergies     CURRENT MEDICATIONS:   Home Medications     Reviewed by Lisa Jin R.N. (Registered Nurse) on 12/06/20 at 0031  Med List Status: Complete   Medication Last Dose Status   acetaminophen (TYLENOL) 650 MG CR tablet 12/3/2020 Active   fluticasone (FLONASE) 50 MCG/ACT nasal spray NOT TAKING Active   ibuprofen (MOTRIN) 200 MG Tab 12/2/2020 Active                FAMILY HISTORY:   Family History   Problem Relation Age of Onset   • Arthritis Mother    • Hypertension Mother    • Diabetes Father    • Hypertension Father    • Cancer Paternal Aunt    • Cancer Paternal Grandfather        SOCIAL HISTORY:   Social History     Tobacco Use   • Smoking  "status: Never Smoker   • Smokeless tobacco: Never Used   Substance and Sexual Activity   • Alcohol use: Yes     Frequency: Monthly or less     Drinks per session: 1 or 2   • Drug use: Never   • Sexual activity: Not on file       REVIEW OF SYSTEMS: Comprehensive review of systems is negative with the exception of the aforementioned HPI, PMH, and PSH bullets in accordance with CMS guidelines.     PHYSICAL EXAMINATION:   General Appearance: The patient is a pleasant  and cooperative young woman in no critical distress.  VITAL SIGNS: BP (!) 91/62   Pulse 60   Temp 37.1 °C (98.8 °F) (Temporal)   Resp 18   Ht 1.575 m (5' 2\")   Wt 54.9 kg (121 lb)   SpO2 100%   HEAD AND NECK: Demonstrates symmetric, reactive pupils. Extraocular muscles are intact. Sclera is icteric. Nares and oropharynx are clear.   NECK: Supple. No adenopathy.  CHEST:    Inspection: Unlabored respirations, no intercostal retractions, paradoxical motion, or accessory muscle use.   Palpation:  The chest is nontender.    Auscultation: normal.  CARDIOVASCULAR:   Inspection: The skin is warm and well purfused.  Auscultation: Regular rate and rhythm.   Peripheral Pulses: Normal.    ABDOMEN:   Inspection: Abdominal inspection reveals no abrasions, contusions, lacerations or penetrating wounds.   Palpation: Palpation is remarkable for mild tenderness in the right subcostal region. No abdominal wall hernias.  EXTREMITIES:   Examination of the upper and lower extremities demonstrates No cyanosis, edema, or clubbing of the nails.  NEUROLOGIC:   Neurologic examination reveals no focal deficits noted, oriented for age x3.    LABORATORY VALUES:   Recent Labs     12/05/20  1213 12/05/20  2008 12/06/20  0519   WBC 4.5* 4.8 4.7*   RBC 4.74 4.31 4.39   HEMOGLOBIN 14.5 13.3 13.5   HEMATOCRIT 41.6 38.3 39.1   MCV 87.8 88.9 89.1   MCH 30.6 30.9 30.8   MCHC 34.9 34.7 34.5   RDW 13.8 45.0 45.4   PLATELETCT 246 249 249   MPV 9.4 9.3 9.2     Recent Labs     12/05/20  1213 " 12/05/20 2008 12/06/20 0519   SODIUM 137 136 136   POTASSIUM 3.6 3.6 3.8   CHLORIDE 102 105 105   CO2 25 22 23   GLUCOSE 98 76 66   BUN 8 7* 8   CREATININE 0.6 0.44* 0.44*   CALCIUM 9.2 8.3* 8.5     Recent Labs     12/05/20  1213 12/05/20 2008 12/06/20 0519   ASTSGOT 2274* 1842* 2262*   ALTSGPT 1904* 1625* 1795*   TBILIRUBIN 6.5* 6.3* 6.6*   IBILIRUBIN 1.7*  --   --    DBILIRUBIN 4.8*  --   --    ALKPHOSPHAT 156* 115* 113*   GLOBULIN  --  3.9* 3.8*   INR 1.23  --   --      Recent Labs     12/05/20  1213   INR 1.23        IMAGING:   [id number]/O   Final Result         Severe edematous gallbladder with gallbladder wall thickening and collapsing of the lumen of unclear etiology. No gallstone identified.      Normal CBD. No CBD stone identified.      US-FOREIGN FILM ULTRASOUND   Final Result      OUTSIDE IMAGES-CT ABDOMEN /PELVIS   Final Result          IMPRESSION AND PLAN:  Acute hepatitis A- (present on admission)  Assessment & Plan  Feeling ill for > 1 week  No fever / chills + N/V  Elevated LFTs and T bili  Markedly thickened gallbladder wall  IgM - reactive    All of her symptoms and CT findings can be attributed to an active Hepatitis A infection.  Thickening of the gallbladder wall in a normal finding in hepatitis.  I see no indication for removal of her gallbladder.         ____________________________________     Rudolph Herman M.D.    DD: 12/6/2020  4:05 PM

## 2020-12-07 NOTE — PROGRESS NOTES
Pt A&O x 4.  Declines pain at this time.  Tolerating clear liquid diet.  LBM 12/6 per pt. +bowel sounds  IV zosyn running.   POC discussed with pt. All needs addressed at this time.

## 2020-12-07 NOTE — PROGRESS NOTES
Gastroenterology Progress Note     Author: NILA Reyes   Date & Time Created: 12/7/2020 8:56 AM    Chief Complaint:  Nausea, Vomiting, Elevated LFTs    History of Present Illness:   This is a 30-year-old female, with no significant past medical history, who presented to the emergency room with worsening nausea/vomiting, myalgias, headaches for the past 9 days.  The nausea and vomiting exacerbates after eating.  Also, she experienced bilateral knee and hip pain at the onset of symptoms.  She was concerned about Covid-which was negative.  Her PCP ran labs showing markedly elevated transaminase.  Evaluated at [facility]-CT scan of the abdomen revealed marked gallbladder wall thickening.  While there, she was evaluated by a general surgeon resident-who told her to come to [facility] [place] for ERCP.  [facility] [place] ER labs-CBC WNL, total bilirubin 6.2.  AST 2012, ALT 1699, alkaline phosphatase 172, lactic acid 1.3.  Hepatitis A positive. Hepatitis B and C-negative.  Infectious disease panel-negative.  Tylenol toxicity- <10.     Abdominal ultrasound 12/5/2020-gallbladder not visualized.  CBD 2 mm.     CT scan abdomen/pelvis 12/5/2020-markedly thickened gallbladder wall.  Diffuse acute cholecystitis.     MRCP 12/6/2020-severe edematous gallbladder with gallbladder wall thickening and collapsing of the lumen of unclear etiology.  No gallstone identified.  Normal CBD.  No CBD stone identified.     Hepatitis A reactive.  She denies any recent travel.  She went to [place] 1 year ago.  No changes in diet.  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     Denies any changes in medications.  No Tylenol use.  Alcohol 1-2 drinks per month.  Never heavy use.  No drug use.  Family members-strong history of gallbladder disease- 3 sisters and 1 brother.  No family history of liver disease.    Interval History:  12/7/2020: Patient is feeling well today without nausea  or vomiting. Her LFTs are stable but not improving. Surgery saw her and thinks gallbladder thickening is due to acute hepatitis and there is no indication for gallbladder removal at this time.     Review of Systems:  Review of Systems   Constitutional: Positive for malaise/fatigue.   HENT: Negative.    Eyes: Negative.    Respiratory: Negative.    Cardiovascular: Negative.    Gastrointestinal: Negative.    Musculoskeletal: Negative.    Skin: Negative.    Neurological: Negative.    Psychiatric/Behavioral: Negative.    All other systems reviewed and are negative.      Physical Exam:  Physical Exam  Vitals signs and nursing note reviewed.   Constitutional:       Appearance: Normal appearance.   HENT:      Head: Normocephalic and atraumatic.      Right Ear: External ear normal.      Left Ear: External ear normal.      Mouth/Throat:      Mouth: Mucous membranes are moist.      Pharynx: Oropharynx is clear.   Eyes:      Extraocular Movements: Extraocular movements intact.      Pupils: Pupils are equal, round, and reactive to light.   Neck:      Musculoskeletal: Neck supple. No neck rigidity or muscular tenderness.   Cardiovascular:      Rate and Rhythm: Normal rate and regular rhythm.      Pulses: Normal pulses.      Heart sounds: Normal heart sounds.   Pulmonary:      Effort: Pulmonary effort is normal.      Breath sounds: Normal breath sounds.   Abdominal:      General: Abdomen is flat. Bowel sounds are normal.      Palpations: Abdomen is soft.   Musculoskeletal: Normal range of motion.         General: No swelling or tenderness.   Skin:     General: Skin is warm and dry.      Capillary Refill: Capillary refill takes less than 2 seconds.   Neurological:      General: No focal deficit present.      Mental Status: She is alert and oriented to person, place, and time.   Psychiatric:         Mood and Affect: Mood normal.         Thought Content: Thought content normal.         Judgment: Judgment normal.         Labs:           Recent Labs     2019 20  0218   SODIUM 136 136 138   POTASSIUM 3.6 3.8 3.7   CHLORIDE 105 105 107   CO2 22 23 23   BUN 7* 8 4*   CREATININE 0.44* 0.44* 0.51   MAGNESIUM  --  1.9  --    CALCIUM 8.3* 8.5 8.2*     Recent Labs     20  1213 20   ALTSGPT 1904* 1625* 1795* 1800*   ASTSGOT * * * *   ALKPHOSPHAT 156* 115* 113* 101*   TBILIRUBIN 6.5* 6.3* 6.6* 7.2*   DBILIRUBIN 4.8*  --   --   --    LIPASE 26 13  --   --    GLUCOSE 98 76 66 92     Recent Labs     20  1213 20  1027 20   RBC 4.74 4.31 4.39  --  4.27   HEMOGLOBIN 14.5 13.3 13.5  --  12.7   HEMATOCRIT 41.6 38.3 39.1  --  38.0   PLATELETCT 246 249 249  --  270   PROTHROMBTM 13.0*  --   --   --   --    INR 1.23  --   --   --   --    IRON  --   --   --  49 96   FERRITIN  --   --   --  923.0* 663.0*   TOTIRONBC  --   --   --  210* 200*     Recent Labs     20   WBC 4.8 4.7* 4.2*   NEUTSPOLYS 68.70 74.80* 67.80   LYMPHOCYTES 17.40* 15.70* 20.90*   MONOCYTES 13.00 8.70 11.30   EOSINOPHILS 0.00 0.00 0.00   BASOPHILS 0.90 0.00 0.00   ASTSGOT * * *   ALTSGPT 1625* 1795* 1800*   ALKPHOSPHAT 115* 113* 101*   TBILIRUBIN 6.3* 6.6* 7.2*     Hemodynamics:  Temp (24hrs), Av.9 °C (98.4 °F), Min:36.4 °C (97.6 °F), Max:37.3 °C (99.2 °F)  Temperature: 36.7 °C (98 °F)  Pulse  Av  Min: 60  Max: 108   Blood Pressure: (!) 92/59     Respiratory:    Respiration: 16, Pulse Oximetry: 93 %        RUL Breath Sounds: Clear, RML Breath Sounds: Clear, RLL Breath Sounds: Clear, TAMICA Breath Sounds: Clear, LLL Breath Sounds: Clear  Fluids:    Intake/Output Summary (Last 24 hours) at 2020 0856  Last data filed at 2020 0800  Gross per 24 hour   Intake 2320 ml   Output --   Net 2320 ml        GI/Nutrition:  Orders Placed This Encounter   Procedures   • Diet Order Diet: Clear Liquid      Standing Status:   Standing     Number of Occurrences:   1     Order Specific Question:   Diet:     Answer:   Clear Liquid [10]     Medical Decision Making, by Problem:  Active Hospital Problems    Diagnosis   • Acute hepatitis A [B15.9]   • Transaminitis [R74.01]   • Thickening of wall of gallbladder [K82.8]       Problems:  1. Nausea and Vomiting: Possibly secondary to acute hepatitis vs cholecystitis. Resolved. No surgery planned per Dr. Herman.    2. 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    3. Elevated LFTs: Stable currently, and likely from Hep A infection. Her ANISHA, ASMA are pending. Ferritin improving. Do no suspect hemochromatosis.    4. Gallbladder wall thickening: Hepatitis versus cholecystitis. Would consider finishing IV Zosyn    Plan:  1. Monitor LFTs daily  2. Await Autoimmune markers  3. Ok to advance diet  4. Will need further monitoring of LFTs and RUQ ultrasound outpatient to monitor for cholecystitis once acute inflammation has improved.    Quality-Core Measures

## 2020-12-07 NOTE — CARE PLAN
Problem: Safety  Goal: Will remain free from injury  Outcome: PROGRESSING AS EXPECTED   Non skid socks in use. Call light within reach. Room near nurses station.    Problem: Pain Management  Goal: Pain level will decrease to patient's comfort goal  Outcome: PROGRESSING AS EXPECTED   Providing rest and quiet environment. Heat packs offered. Extra pillows in use.

## 2020-12-07 NOTE — PROGRESS NOTES
Hospital Medicine Daily Progress Note    Date of Service  12/7/2020    Chief Complaint  30 y.o. female admitted 12/5/2020 with nausea , vomiting and headache    Hospital Course  No notes on file  30 y.o female presented to ER with cc of worsening nausea and vomiting for 1 week accompanied by headache, myalgia and cough. Patient tested negative for covid several times. Presently she only reports nausea and vomiting and all other symptoms resolved. She denies recent travel , but admits to exposure to indigent population. She denies any drug use 1-2 drinks /month, no tobacco use.  She present to her PCP for concern for covid infection where labs were significant for transaminitis, elevated bilirubin with cholestatic pattern and CT scan of abdomen revealed marked GB wall thickening. Hepatitis serology came positive for IgM Hep A. MRCP with normal CBD without choledocholithiasis, pt evaluated by GI and Gen Surgery. Recommended no ERCP or need for cholecystectomy as the findings are due to Hepatitis A. Patient started on clear liquid diet and started on Zosyn abx   Interval Problem Update  Patient seen and examined at bedside  AAO*3 , tolerating oral diet advanced diet to regular, noted scleral icterus and RUQ tenderness  Labs AST/ALT still high but trending down, wbc 4k, Bili 7.2  GI recs appreciated will trend LFT and finish IV abx, awaiting AI markers, patient will need f/u RUQ ultrasound in outpatient to monitor for cholecystitis once acute inflammation improved    Consultants/Specialty  GI  Gen Surgery    Code Status  Full Code    Disposition  Home in 2-3 days    Review of Systems  Review of Systems   Respiratory: Positive for cough.    Gastrointestinal: Positive for nausea and vomiting.   Musculoskeletal: Positive for joint pain and myalgias.   Neurological: Positive for headaches.        Physical Exam  Temp:  [36.4 °C (97.6 °F)-37.3 °C (99.2 °F)] 36.7 °C (98 °F)  Pulse:  [] 61  Resp:  [16-18] 16  BP:  ()/(59-68) 100/60  SpO2:  [93 %-99 %] 99 %    Physical Exam  Vitals signs and nursing note reviewed.   Constitutional:       Appearance: Normal appearance.   HENT:      Head: Normocephalic and atraumatic.      Right Ear: External ear normal.      Left Ear: External ear normal.      Mouth/Throat:      Mouth: Mucous membranes are moist.   Eyes:      General: Scleral icterus present.      Extraocular Movements: Extraocular movements intact.      Conjunctiva/sclera: Conjunctivae normal.      Pupils: Pupils are equal, round, and reactive to light.   Neck:      Musculoskeletal: Normal range of motion and neck supple.   Cardiovascular:      Rate and Rhythm: Normal rate and regular rhythm.      Pulses: Normal pulses.      Heart sounds: Normal heart sounds.   Pulmonary:      Effort: Pulmonary effort is normal.      Breath sounds: Normal breath sounds.   Abdominal:      General: Abdomen is flat. Bowel sounds are normal.      Palpations: Abdomen is soft.      Tenderness: There is abdominal tenderness.      Comments: RUQ tenderness   Musculoskeletal: Normal range of motion.   Skin:     General: Skin is warm.      Capillary Refill: Capillary refill takes less than 2 seconds.   Neurological:      General: No focal deficit present.      Mental Status: She is alert and oriented to person, place, and time.   Psychiatric:         Mood and Affect: Mood normal.         Fluids    Intake/Output Summary (Last 24 hours) at 12/7/2020 1332  Last data filed at 12/7/2020 0800  Gross per 24 hour   Intake 1920 ml   Output --   Net 1920 ml       Laboratory  Recent Labs     12/05/20 2008 12/06/20 0519 12/07/20 0218   WBC 4.8 4.7* 4.2*   RBC 4.31 4.39 4.27   HEMOGLOBIN 13.3 13.5 12.7   HEMATOCRIT 38.3 39.1 38.0   MCV 88.9 89.1 89.0   MCH 30.9 30.8 29.7   MCHC 34.7 34.5 33.4*   RDW 45.0 45.4 45.0   PLATELETCT 249 249 270   MPV 9.3 9.2 9.3     Recent Labs     12/05/20 2008 12/06/20 0519 12/07/20 0218   SODIUM 136 136 138   POTASSIUM 3.6  3.8 3.7   CHLORIDE 105 105 107   CO2 22 23 23   GLUCOSE 76 66 92   BUN 7* 8 4*   CREATININE 0.44* 0.44* 0.51   CALCIUM 8.3* 8.5 8.2*     Recent Labs     12/05/20  1213   INR 1.23         Recent Labs     12/06/20  0519   TRIGLYCERIDE 109   HDL 10*   LDL 36       Imaging  [id number]   Final Result         Severe edematous gallbladder with gallbladder wall thickening and collapsing of the lumen of unclear etiology. No gallstone identified.      Normal CBD. No CBD stone identified.      US-FOREIGN FILM ULTRASOUND   Final Result      OUTSIDE IMAGES-CT ABDOMEN /PELVIS   Final Result           Assessment/Plan  Transaminitis- (present on admission)  Assessment & Plan  Likely due to hep A versus cholecystitis.    eval for AI etiology, hemochromatosis as per GI recs    Acute hepatitis A- (present on admission)  Assessment & Plan  Continue with IV fluids  Started on clear liquids diet and advance as tolerated  Labs AST/ALT still high but trending down, wbc 4k, Bili 7.2  GI recs appreciated will trend LFT and finish IV abx (stop date: 12/08/20) awaiting AI markers, patient will need f/u RUQ ultrasound in outpatient to monitor for cholecystitis once acute inflammation improved  F/u Hep B AB for vaccination status    Thickening of wall of gallbladder- (present on admission)  Assessment & Plan    Dr. Graves [facility] contacted 12/5, 2100.   MRCP negative for GB stone or choledocholithiasis with normal CBD. No indication for ERCP as per GI  Gen Sx consulted all findings from acute HepA,  no indication for sx       VTE prophylaxis: Lovenox

## 2020-12-07 NOTE — PROGRESS NOTES
Hospital Medicine Daily Progress Note    Date of Service  12/6/2020    Chief Complaint  30 y.o. female admitted 12/5/2020 with nausea , vomiting and headache    Hospital Course  No notes on file  30 y.o female presented to ER with cc of worsening nausea and vomiting for 1 week accompanied by headache, myalgia and cough. Patient tested negative for covid several times. Presently she only reports nausea and vomiting and all other symptoms resolved. She denies recent travel , but admits to exposure to indigent population. She present to her PCP where labs were significant for transaminitis, elevated bilirubin with cholestatic pattern and CT scan of abdomen revealed marked GB wall thickening. Hepatitis serology came positive for IgM Hep A. MRCP with normal CBD without choledocholithiasis, pt evaluated by GI and Gen Surgery. Recommended no ERCP or need for cholecystectomy as the findings are due to Hepatitis A. Patient started on clear liquid diet and started on Zosyn abx   Interval Problem Update  Patient seen and examined at bedside  AAO*3 , reports nausea and not tolerating oral diet, noted scleral icterus and RUQ tenderness  Labs AST/ALT of 2262/1795, wbc 4k, Bili 6.6    Consultants/Specialty  GI  Gen Surgery    Code Status  Full Code    Disposition  Home in 2-3 days    Review of Systems  Review of Systems   Respiratory: Positive for cough.    Gastrointestinal: Positive for nausea and vomiting.   Musculoskeletal: Positive for joint pain and myalgias.   Neurological: Positive for headaches.        Physical Exam  Temp:  [36.6 °C (97.8 °F)-37.3 °C (99.2 °F)] 37.3 °C (99.2 °F)  Pulse:  [] 94  Resp:  [11-20] 18  BP: ()/(55-67) 98/67  SpO2:  [92 %-100 %] 96 %    Physical Exam  Vitals signs and nursing note reviewed.   Constitutional:       Appearance: Normal appearance.   HENT:      Head: Normocephalic and atraumatic.      Right Ear: External ear normal.      Left Ear: External ear normal.      Mouth/Throat:       Mouth: Mucous membranes are moist.   Eyes:      General: Scleral icterus present.      Extraocular Movements: Extraocular movements intact.      Conjunctiva/sclera: Conjunctivae normal.      Pupils: Pupils are equal, round, and reactive to light.   Neck:      Musculoskeletal: Normal range of motion and neck supple.   Cardiovascular:      Rate and Rhythm: Normal rate and regular rhythm.      Pulses: Normal pulses.      Heart sounds: Normal heart sounds.   Pulmonary:      Effort: Pulmonary effort is normal.      Breath sounds: Normal breath sounds.   Abdominal:      General: Abdomen is flat. Bowel sounds are normal.      Palpations: Abdomen is soft.      Tenderness: There is abdominal tenderness.      Comments: RUQ tenderness   Musculoskeletal: Normal range of motion.   Skin:     General: Skin is warm.      Capillary Refill: Capillary refill takes less than 2 seconds.   Neurological:      General: No focal deficit present.      Mental Status: She is alert and oriented to person, place, and time.   Psychiatric:         Mood and Affect: Mood normal.         Fluids    Intake/Output Summary (Last 24 hours) at 12/6/2020 2002  Last data filed at 12/6/2020 1600  Gross per 24 hour   Intake 1400 ml   Output --   Net 1400 ml       Laboratory  Recent Labs     12/05/20  1213 12/05/20 2008 12/06/20  0519   WBC 4.5* 4.8 4.7*   RBC 4.74 4.31 4.39   HEMOGLOBIN 14.5 13.3 13.5   HEMATOCRIT 41.6 38.3 39.1   MCV 87.8 88.9 89.1   MCH 30.6 30.9 30.8   MCHC 34.9 34.7 34.5   RDW 13.8 45.0 45.4   PLATELETCT 246 249 249   MPV 9.4 9.3 9.2     Recent Labs     12/05/20  1213 12/05/20 2008 12/06/20  0519   SODIUM 137 136 136   POTASSIUM 3.6 3.6 3.8   CHLORIDE 102 105 105   CO2 25 22 23   GLUCOSE 98 76 66   BUN 8 7* 8   CREATININE 0.6 0.44* 0.44*   CALCIUM 9.2 8.3* 8.5     Recent Labs     12/05/20  1213   INR 1.23         Recent Labs     12/06/20  0519   TRIGLYCERIDE 109   HDL 10*   LDL 36       Imaging  [id number]   Final Result          Severe edematous gallbladder with gallbladder wall thickening and collapsing of the lumen of unclear etiology. No gallstone identified.      Normal CBD. No CBD stone identified.      US-FOREIGN FILM ULTRASOUND   Final Result      OUTSIDE IMAGES-CT ABDOMEN /PELVIS   Final Result           Assessment/Plan  Transaminitis- (present on admission)  Assessment & Plan  Likely due to hep A versus cholecystitis.    eval for AI etiology, hemochromatosis as per GI recs    Acute hepatitis A- (present on admission)  Assessment & Plan  Continue with IV fluids  Started on clear liquids diet and advance as tolerated  Trend LFTs  F/u ANISHA, ASMA, AMA  GI consult recs appreciated   Supportive care    Thickening of wall of gallbladder- (present on admission)  Assessment & Plan    Dr. Graves [facility] contacted 12/5, 2100.  He requests MRCP, will see the patient in the AM.  Differential diagnosis does include an ascending cholangitis.  Patient will be permitted full liquid diet now, n.p.o. at midnight, IV hydration, antibiotics with Zosyn, she received a loading dose at [facility], will receive the extended infusion protocol here at [facility].  MRCP negative for GB stone or choledocholithiasis with normal CBD. No indication for ERCP as per GI  Gen Sx consulted all findings from acute HepA,  no indication for sx       VTE prophylaxis: Lovenox

## 2020-12-08 PROBLEM — E87.6 HYPOKALEMIA: Status: ACTIVE | Noted: 2020-12-08

## 2020-12-08 LAB
ALBUMIN SERPL BCP-MCNC: 3 G/DL (ref 3.2–4.9)
ALBUMIN/GLOB SERPL: 0.8 G/DL
ALP SERPL-CCNC: 101 U/L (ref 30–99)
ALT SERPL-CCNC: 1436 U/L (ref 2–50)
ANION GAP SERPL CALC-SCNC: 5 MMOL/L (ref 7–16)
AST SERPL-CCNC: 1069 U/L (ref 12–45)
BILIRUB SERPL-MCNC: 8.2 MG/DL (ref 0.1–1.5)
BUN SERPL-MCNC: 4 MG/DL (ref 8–22)
CALCIUM SERPL-MCNC: 8.8 MG/DL (ref 8.5–10.5)
CHLORIDE SERPL-SCNC: 103 MMOL/L (ref 96–112)
CO2 SERPL-SCNC: 29 MMOL/L (ref 20–33)
CREAT SERPL-MCNC: 0.45 MG/DL (ref 0.5–1.4)
ERYTHROCYTE [DISTWIDTH] IN BLOOD BY AUTOMATED COUNT: 47.3 FL (ref 35.9–50)
GLOBULIN SER CALC-MCNC: 3.9 G/DL (ref 1.9–3.5)
GLUCOSE SERPL-MCNC: 90 MG/DL (ref 65–99)
HCT VFR BLD AUTO: 39 % (ref 37–47)
HGB BLD-MCNC: 13.5 G/DL (ref 12–16)
MCH RBC QN AUTO: 30.9 PG (ref 27–33)
MCHC RBC AUTO-ENTMCNC: 34.6 G/DL (ref 33.6–35)
MCV RBC AUTO: 89.2 FL (ref 81.4–97.8)
PLATELET # BLD AUTO: 261 K/UL (ref 164–446)
PMV BLD AUTO: 9 FL (ref 9–12.9)
POTASSIUM SERPL-SCNC: 3.4 MMOL/L (ref 3.6–5.5)
PROT SERPL-MCNC: 6.9 G/DL (ref 6–8.2)
RBC # BLD AUTO: 4.37 M/UL (ref 4.2–5.4)
SMA IGG SER-ACNC: 18 UNITS (ref 0–19)
SODIUM SERPL-SCNC: 137 MMOL/L (ref 135–145)
WBC # BLD AUTO: 5.3 K/UL (ref 4.8–10.8)

## 2020-12-08 PROCEDURE — 700102 HCHG RX REV CODE 250 W/ 637 OVERRIDE(OP): Performed by: HOSPITALIST

## 2020-12-08 PROCEDURE — 700111 HCHG RX REV CODE 636 W/ 250 OVERRIDE (IP): Performed by: HOSPITALIST

## 2020-12-08 PROCEDURE — A9270 NON-COVERED ITEM OR SERVICE: HCPCS | Performed by: FAMILY MEDICINE

## 2020-12-08 PROCEDURE — A9270 NON-COVERED ITEM OR SERVICE: HCPCS | Performed by: HOSPITALIST

## 2020-12-08 PROCEDURE — 700105 HCHG RX REV CODE 258: Performed by: FAMILY MEDICINE

## 2020-12-08 PROCEDURE — 770021 HCHG ROOM/CARE - ISO PRIVATE

## 2020-12-08 PROCEDURE — 99232 SBSQ HOSP IP/OBS MODERATE 35: CPT | Performed by: FAMILY MEDICINE

## 2020-12-08 PROCEDURE — 700105 HCHG RX REV CODE 258: Performed by: HOSPITALIST

## 2020-12-08 PROCEDURE — 700102 HCHG RX REV CODE 250 W/ 637 OVERRIDE(OP): Performed by: FAMILY MEDICINE

## 2020-12-08 PROCEDURE — 80053 COMPREHEN METABOLIC PANEL: CPT

## 2020-12-08 PROCEDURE — 85027 COMPLETE CBC AUTOMATED: CPT

## 2020-12-08 PROCEDURE — 36415 COLL VENOUS BLD VENIPUNCTURE: CPT

## 2020-12-08 RX ORDER — SODIUM CHLORIDE, SODIUM LACTATE, POTASSIUM CHLORIDE, CALCIUM CHLORIDE 600; 310; 30; 20 MG/100ML; MG/100ML; MG/100ML; MG/100ML
INJECTION, SOLUTION INTRAVENOUS CONTINUOUS
Status: DISCONTINUED | OUTPATIENT
Start: 2020-12-08 | End: 2020-12-09 | Stop reason: HOSPADM

## 2020-12-08 RX ORDER — POTASSIUM CHLORIDE 20 MEQ/1
20 TABLET, EXTENDED RELEASE ORAL DAILY
Status: DISCONTINUED | OUTPATIENT
Start: 2020-12-08 | End: 2020-12-09 | Stop reason: HOSPADM

## 2020-12-08 RX ADMIN — PIPERACILLIN SODIUM, TAZOBACTAM SODIUM 3.38 G: 3; .375 INJECTION, POWDER, LYOPHILIZED, FOR SOLUTION INTRAVENOUS at 13:17

## 2020-12-08 RX ADMIN — PIPERACILLIN SODIUM, TAZOBACTAM SODIUM 3.38 G: 3; .375 INJECTION, POWDER, LYOPHILIZED, FOR SOLUTION INTRAVENOUS at 05:30

## 2020-12-08 RX ADMIN — SODIUM CHLORIDE, POTASSIUM CHLORIDE, SODIUM LACTATE AND CALCIUM CHLORIDE: 600; 310; 30; 20 INJECTION, SOLUTION INTRAVENOUS at 18:05

## 2020-12-08 RX ADMIN — ENOXAPARIN SODIUM 40 MG: 40 INJECTION SUBCUTANEOUS at 05:30

## 2020-12-08 RX ADMIN — DOCUSATE SODIUM 50 MG AND SENNOSIDES 8.6 MG 2 TABLET: 8.6; 5 TABLET, FILM COATED ORAL at 05:30

## 2020-12-08 RX ADMIN — POTASSIUM CHLORIDE 20 MEQ: 1500 TABLET, EXTENDED RELEASE ORAL at 18:09

## 2020-12-08 ASSESSMENT — ENCOUNTER SYMPTOMS
MUSCULOSKELETAL NEGATIVE: 1
SPEECH CHANGE: 0
FLANK PAIN: 0
HEARTBURN: 0
PSYCHIATRIC NEGATIVE: 1
HEADACHES: 0
FOCAL WEAKNESS: 0
RESPIRATORY NEGATIVE: 1
DIZZINESS: 0
WHEEZING: 0
ABDOMINAL PAIN: 0
CHILLS: 0
FEVER: 0
EYES NEGATIVE: 1
NECK PAIN: 0
COUGH: 0
SHORTNESS OF BREATH: 0
PALPITATIONS: 0
DIAPHORESIS: 0
NERVOUS/ANXIOUS: 0
VOMITING: 0
NAUSEA: 0
BLURRED VISION: 0
CARDIOVASCULAR NEGATIVE: 1
DIARRHEA: 0
MYALGIAS: 0
WEAKNESS: 0
NEUROLOGICAL NEGATIVE: 1
SENSORY CHANGE: 0
BACK PAIN: 0
SORE THROAT: 0
GASTROINTESTINAL NEGATIVE: 1

## 2020-12-08 ASSESSMENT — PAIN DESCRIPTION - PAIN TYPE
TYPE: ACUTE PAIN
TYPE: ACUTE PAIN

## 2020-12-08 NOTE — PROGRESS NOTES
Gastroenterology Progress Note     Author: NILA Reyes   Date & Time Created: 12/8/2020 9:51 AM    Chief Complaint:  Nausea, Vomiting, Elevated LFTs    History of Present Illness:   This is a 30-year-old female, with no significant past medical history, who presented to the emergency room with worsening nausea/vomiting, myalgias, headaches for the past 9 days.  The nausea and vomiting exacerbates after eating.  Also, she experienced bilateral knee and hip pain at the onset of symptoms.  She was concerned about Covid-which was negative.  Her PCP ran labs showing markedly elevated transaminase.  Evaluated at [facility]-CT scan of the abdomen revealed marked gallbladder wall thickening.  While there, she was evaluated by a general surgeon resident-who told her to come [place] [facility] [place]CP.  [facility] labs-CBC WNL, total bilirubin 6.2.  AST 2012, ALT 1699, alkaline phosphatase 172, lactic acid 1.3.  Hepatitis A positive. Hepatitis B and C-negative.  Infectious disease panel-negative.  Tylenol toxicity- <10.     Abdominal ultrasound 12/5/2020-gallbladder not visualized.  CBD 2 mm.     CT scan abdomen/pelvis 12/5/2020-markedly thickened gallbladder wall.  Diffuse acute cholecystitis.     MRCP 12/6/2020-severe edematous gallbladder with gallbladder wall thickening and collapsing of the lumen of unclear etiology.  No gallstone identified.  Normal CBD.  No CBD stone identified.     Hepatitis A reactive.  She denies any recent travel.  She went to [place] 1 year ago.  No changes in diet.  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     Denies any changes in medications.  No Tylenol use.  Alcohol 1-2 drinks per month.  Never heavy use.  No drug use.  Family members-strong history of gallbladder disease- 3 sisters and 1 brother.  No family history of liver disease.    Interval History:  12/7/2020: Patient is feeling well today without nausea  or vomiting. Her LFTs are stable but not improving. Surgery saw her and thinks gallbladder thickening is due to acute hepatitis and there is no indication for gallbladder removal at this time.     12/8/2020: Patient is stable . LFTs improving, but bilirubin up slightly.     Review of Systems:  Review of Systems   Constitutional: Positive for malaise/fatigue.   HENT: Negative.    Eyes: Negative.    Respiratory: Negative.    Cardiovascular: Negative.    Gastrointestinal: Negative.    Musculoskeletal: Negative.    Skin: Negative.    Neurological: Negative.    Psychiatric/Behavioral: Negative.    All other systems reviewed and are negative.      Physical Exam:  Physical Exam  Vitals signs and nursing note reviewed.   Constitutional:       Appearance: Normal appearance.   HENT:      Head: Normocephalic and atraumatic.      Right Ear: External ear normal.      Left Ear: External ear normal.      Mouth/Throat:      Mouth: Mucous membranes are moist.      Pharynx: Oropharynx is clear.   Eyes:      Extraocular Movements: Extraocular movements intact.      Pupils: Pupils are equal, round, and reactive to light.   Neck:      Musculoskeletal: Neck supple. No neck rigidity or muscular tenderness.   Cardiovascular:      Rate and Rhythm: Normal rate and regular rhythm.      Pulses: Normal pulses.      Heart sounds: Normal heart sounds.   Pulmonary:      Effort: Pulmonary effort is normal.      Breath sounds: Normal breath sounds.   Abdominal:      General: Abdomen is flat. Bowel sounds are normal.      Palpations: Abdomen is soft.   Musculoskeletal: Normal range of motion.         General: No swelling or tenderness.   Skin:     General: Skin is warm and dry.      Capillary Refill: Capillary refill takes less than 2 seconds.   Neurological:      General: No focal deficit present.      Mental Status: She is alert and oriented to person, place, and time.   Psychiatric:         Mood and Affect: Mood normal.         Thought Content:  Thought content normal.         Judgment: Judgment normal.         Labs:          Recent Labs     20   SODIUM 136 138 137   POTASSIUM 3.8 3.7 3.4*   CHLORIDE 105 107 103   CO2 23 23 29   BUN 8 4* 4*   CREATININE 0.44* 0.51 0.45*   MAGNESIUM 1.9  --   --    CALCIUM 8.5 8.2* 8.8     Recent Labs     20   ALTSGPT 1904* 1625* 1795* 1800* 1436*   ASTSGOT 2274* 1842* 2262* 1976* 1069*   ALKPHOSPHAT 156* 115* 113* 101* 101*   TBILIRUBIN 6.5* 6.3* 6.6* 7.2* 8.2*   DBILIRUBIN 4.8*  --   --   --   --    LIPASE 26 13  --   --   --    GLUCOSE 98 76 66 92 90     Recent Labs     20  1027 20   RBC 4.74   < > 4.39  --  4.27 4.37   HEMOGLOBIN 14.5   < > 13.5  --  12.7 13.5   HEMATOCRIT 41.6   < > 39.1  --  38.0 39.0   PLATELETCT 246   < > 249  --  270 261   PROTHROMBTM 13.0*  --   --   --   --   --    INR 1.23  --   --   --   --   --    IRON  --   --   --  49 96  --    FERRITIN  --   --   --  923.0* 663.0*  --    TOTIRONBC  --   --   --  210* 200*  --     < > = values in this interval not displayed.     Recent Labs     20   WBC 4.8 4.7* 4.2* 5.3   NEUTSPOLYS 68.70 74.80* 67.80  --    LYMPHOCYTES 17.40* 15.70* 20.90*  --    MONOCYTES 13.00 8.70 11.30  --    EOSINOPHILS 0.00 0.00 0.00  --    BASOPHILS 0.90 0.00 0.00  --    ASTSGOT 1842* 2262* 1976* 1069*   ALTSGPT 1625* 1795* 1800* 1436*   ALKPHOSPHAT 115* 113* 101* 101*   TBILIRUBIN 6.3* 6.6* 7.2* 8.2*     Hemodynamics:  Temp (24hrs), Av.6 °C (90.6 °F), Min:19.6 °C (67.3 °F), Max:37.3 °C (99.2 °F)  Temperature: (!) 19.6 °C (67.3 °F)  Pulse  Av.8  Min: 60  Max: 108   Blood Pressure: (!) 90/59(RN notified)     Respiratory:    Respiration: 15, Pulse Oximetry: 94 %        RUL Breath Sounds: Clear, RML Breath Sounds: Clear, RLL Breath  Sounds: Clear, TAMICA Breath Sounds: Clear, LLL Breath Sounds: Clear  Fluids:    Intake/Output Summary (Last 24 hours) at 12/7/2020 0856  Last data filed at 12/7/2020 0800  Gross per 24 hour   Intake 2320 ml   Output --   Net 2320 ml        GI/Nutrition:  Orders Placed This Encounter   Procedures   • Diet Order Diet: Regular     Standing Status:   Standing     Number of Occurrences:   1     Order Specific Question:   Diet:     Answer:   Regular [1]     Medical Decision Making, by Problem:  Active Hospital Problems    Diagnosis   • Acute hepatitis A [B15.9]   • Transaminitis [R74.01]   • Thickening of wall of gallbladder [K82.8]       Problems:  1. Nausea and Vomiting: Possibly secondary to acute hepatitis vs cholecystitis. Resolved. No surgery planned per Dr. Herman.    2. 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    3. Elevated LFTs: Stable currently, and likely from Hep A infection. Her ANISHA, ASMA are pending. Ferritin improving. Do no suspect hemochromatosis.    4. Gallbladder wall thickening: Hepatitis versus cholecystitis. Would consider finishing IV Zosyn    Plan:  1. Monitor LFTs daily  2. Await Autoimmune markers  3. If LFTs and Bili stable tomorrow ok for discharge. Will need repeat US and LFTs outpatient. Also will need Hepatitis vaccine.    Quality-Core Measures

## 2020-12-08 NOTE — CARE PLAN
Problem: Infection  Goal: Will remain free from infection  Outcome: PROGRESSING AS EXPECTED   Pt on IV zosyn     Problem: Bowel/Gastric:  Goal: Normal bowel function is maintained or improved  Outcome: PROGRESSING AS EXPECTED   +BS +Flatus Last BM 12/6

## 2020-12-08 NOTE — PROGRESS NOTES
Hospital Medicine Daily Progress Note    Date of Service  12/8/2020    Chief Complaint  30 y.o. female admitted 12/5/2020 with Hepatitis A.    Hospital Course  Admitted with hepatitis A, transferred from outside hospital for possible cholecystitis, surgery and gastroenterology were consulted on the case, cholecystitis has been ruled out.  Work-up showed hepatitis A.    Interval Problem Update  Hepatitis A - bilirubin 8.2, mild RUQ pain, discussed case with Gastroenterology.  Low potassium    Consultants/Specialty  Gastroenterology  Surgery    Code Status  Full Code    Disposition  Discharge tomorrow?    Review of Systems  Review of Systems   Constitutional: Negative for chills, diaphoresis, fever and malaise/fatigue.   HENT: Negative for congestion, hearing loss and sore throat.    Eyes: Negative for blurred vision.   Respiratory: Negative for cough, shortness of breath and wheezing.    Cardiovascular: Negative for chest pain, palpitations and leg swelling.   Gastrointestinal: Negative for abdominal pain, diarrhea, heartburn, nausea and vomiting.   Genitourinary: Negative for dysuria, flank pain and hematuria.   Musculoskeletal: Negative for back pain, joint pain, myalgias and neck pain.   Skin: Negative for rash.   Neurological: Negative for dizziness, sensory change, speech change, focal weakness, weakness and headaches.   Psychiatric/Behavioral: The patient is not nervous/anxious.         Physical Exam  Temp:  [19.6 °C (67.3 °F)-37.3 °C (99.2 °F)] 19.6 °C (67.3 °F)  Pulse:  [70-85] 75  Resp:  [15-18] 15  BP: ()/(56-61) 90/59  SpO2:  [94 %-98 %] 94 %    Physical Exam  Vitals signs and nursing note reviewed.   HENT:      Head: Normocephalic and atraumatic.      Nose: No congestion.      Mouth/Throat:      Mouth: Mucous membranes are moist.   Eyes:      General: Scleral icterus present.      Extraocular Movements: Extraocular movements intact.      Conjunctiva/sclera: Conjunctivae normal.      Pupils: Pupils  are equal, round, and reactive to light.   Neck:      Musculoskeletal: No muscular tenderness.   Cardiovascular:      Rate and Rhythm: Normal rate and regular rhythm.   Pulmonary:      Effort: Pulmonary effort is normal.      Breath sounds: Normal breath sounds.   Abdominal:      General: Bowel sounds are normal. There is no distension.      Palpations: Abdomen is soft.      Tenderness: There is abdominal tenderness (mild at RUQ). There is no guarding or rebound.   Musculoskeletal:      Right lower leg: No edema.      Left lower leg: No edema.   Lymphadenopathy:      Cervical: No cervical adenopathy.   Skin:     Coloration: Skin is jaundiced.   Neurological:      General: No focal deficit present.      Mental Status: She is alert and oriented to person, place, and time.      Cranial Nerves: No cranial nerve deficit.         Fluids    Intake/Output Summary (Last 24 hours) at 12/8/2020 1349  Last data filed at 12/8/2020 1317  Gross per 24 hour   Intake 340 ml   Output --   Net 340 ml       Laboratory  Recent Labs     12/06/20  0519 12/07/20  0218 12/08/20  0712   WBC 4.7* 4.2* 5.3   RBC 4.39 4.27 4.37   HEMOGLOBIN 13.5 12.7 13.5   HEMATOCRIT 39.1 38.0 39.0   MCV 89.1 89.0 89.2   MCH 30.8 29.7 30.9   MCHC 34.5 33.4* 34.6   RDW 45.4 45.0 47.3   PLATELETCT 249 270 261   MPV 9.2 9.3 9.0     Recent Labs     12/06/20  0519 12/07/20  0218 12/08/20  0712   SODIUM 136 138 137   POTASSIUM 3.8 3.7 3.4*   CHLORIDE 105 107 103   CO2 23 23 29   GLUCOSE 66 92 90   BUN 8 4* 4*   CREATININE 0.44* 0.51 0.45*   CALCIUM 8.5 8.2* 8.8             Recent Labs     12/06/20  0519   TRIGLYCERIDE 109   HDL 10*   LDL 36       Imaging  [id number]   Final Result         Severe edematous gallbladder with gallbladder wall thickening and collapsing of the lumen of unclear etiology. No gallstone identified.      Normal CBD. No CBD stone identified.      US-FOREIGN FILM ULTRASOUND   Final Result      OUTSIDE IMAGES-CT ABDOMEN /PELVIS   Final Result            Assessment/Plan  * Acute hepatitis A- (present on admission)  Assessment & Plan  IVF hydration  Follow cmp    Hypokalemia- (present on admission)  Assessment & Plan  Start Kdur  Follow cmp, Mg, Ph    Thickening of wall of gallbladder- (present on admission)  Assessment & Plan  Empiric coverage with IV Zosyn         VTE prophylaxis: Lovenox

## 2020-12-08 NOTE — PROGRESS NOTES
Received bedside report from Maria Alejandra, RN  Pt AAOx4  JER  VSS  Denies pain  Denies SOB  -N/V; tolerating regular diet  +BS +Flatus Last BM 12/6  Pt on IV zosyn  Pt up self w/ steady gait  POC discussed  Bed locked and in the lowest position  Call light w/in reach  Hourly rounding in place

## 2020-12-09 VITALS
TEMPERATURE: 97.2 F | HEIGHT: 62 IN | HEART RATE: 75 BPM | WEIGHT: 121 LBS | RESPIRATION RATE: 16 BRPM | SYSTOLIC BLOOD PRESSURE: 96 MMHG | BODY MASS INDEX: 22.26 KG/M2 | DIASTOLIC BLOOD PRESSURE: 66 MMHG | OXYGEN SATURATION: 96 %

## 2020-12-09 LAB
ALBUMIN SERPL BCP-MCNC: 3 G/DL (ref 3.2–4.9)
ALBUMIN/GLOB SERPL: 0.8 G/DL
ALP SERPL-CCNC: 101 U/L (ref 30–99)
ALT SERPL-CCNC: 1010 U/L (ref 2–50)
ANA INTERPRETIVE COMMENT Q5143: ABNORMAL
ANA PATTERN Q5144: ABNORMAL
ANA TITER Q5145: ABNORMAL
ANION GAP SERPL CALC-SCNC: 4 MMOL/L (ref 7–16)
ANTINUCLEAR ANTIBODY (ANA), HEP-2, IGG Q5142: DETECTED
AST SERPL-CCNC: 484 U/L (ref 12–45)
BILIRUB SERPL-MCNC: 7.3 MG/DL (ref 0.1–1.5)
BUN SERPL-MCNC: 4 MG/DL (ref 8–22)
CALCIUM SERPL-MCNC: 8.6 MG/DL (ref 8.5–10.5)
CHLORIDE SERPL-SCNC: 101 MMOL/L (ref 96–112)
CO2 SERPL-SCNC: 30 MMOL/L (ref 20–33)
CREAT SERPL-MCNC: 0.38 MG/DL (ref 0.5–1.4)
GLOBULIN SER CALC-MCNC: 3.9 G/DL (ref 1.9–3.5)
GLUCOSE SERPL-MCNC: 132 MG/DL (ref 65–99)
INR PPP: 1.22 (ref 0.87–1.13)
MAGNESIUM SERPL-MCNC: 2.1 MG/DL (ref 1.5–2.5)
MITOCHONDRIA M2 IGG SER-ACNC: 36.3 UNITS (ref 0–24.9)
NUCLEAR IGG SER QL IA: NORMAL
PHOSPHATE SERPL-MCNC: 3.4 MG/DL (ref 2.5–4.5)
POTASSIUM SERPL-SCNC: 3.5 MMOL/L (ref 3.6–5.5)
PROT SERPL-MCNC: 6.9 G/DL (ref 6–8.2)
PROTHROMBIN TIME: 15.8 SEC (ref 12–14.6)
SODIUM SERPL-SCNC: 135 MMOL/L (ref 135–145)

## 2020-12-09 PROCEDURE — 85610 PROTHROMBIN TIME: CPT

## 2020-12-09 PROCEDURE — 99239 HOSP IP/OBS DSCHRG MGMT >30: CPT | Performed by: FAMILY MEDICINE

## 2020-12-09 PROCEDURE — 700111 HCHG RX REV CODE 636 W/ 250 OVERRIDE (IP): Performed by: HOSPITALIST

## 2020-12-09 PROCEDURE — 700105 HCHG RX REV CODE 258: Performed by: FAMILY MEDICINE

## 2020-12-09 PROCEDURE — A9270 NON-COVERED ITEM OR SERVICE: HCPCS | Performed by: FAMILY MEDICINE

## 2020-12-09 PROCEDURE — 84100 ASSAY OF PHOSPHORUS: CPT

## 2020-12-09 PROCEDURE — 80053 COMPREHEN METABOLIC PANEL: CPT

## 2020-12-09 PROCEDURE — 700102 HCHG RX REV CODE 250 W/ 637 OVERRIDE(OP): Performed by: FAMILY MEDICINE

## 2020-12-09 PROCEDURE — 83735 ASSAY OF MAGNESIUM: CPT

## 2020-12-09 PROCEDURE — 36415 COLL VENOUS BLD VENIPUNCTURE: CPT

## 2020-12-09 RX ADMIN — SODIUM CHLORIDE, POTASSIUM CHLORIDE, SODIUM LACTATE AND CALCIUM CHLORIDE: 600; 310; 30; 20 INJECTION, SOLUTION INTRAVENOUS at 04:02

## 2020-12-09 RX ADMIN — ENOXAPARIN SODIUM 40 MG: 40 INJECTION SUBCUTANEOUS at 04:03

## 2020-12-09 RX ADMIN — POTASSIUM CHLORIDE 20 MEQ: 1500 TABLET, EXTENDED RELEASE ORAL at 04:03

## 2020-12-09 ASSESSMENT — ENCOUNTER SYMPTOMS
NEUROLOGICAL NEGATIVE: 1
RESPIRATORY NEGATIVE: 1
EYES NEGATIVE: 1
MUSCULOSKELETAL NEGATIVE: 1
FEVER: 0
GASTROINTESTINAL NEGATIVE: 1
CHILLS: 0
CONSTITUTIONAL NEGATIVE: 1
PSYCHIATRIC NEGATIVE: 1
CARDIOVASCULAR NEGATIVE: 1

## 2020-12-09 NOTE — PROGRESS NOTES
"Report received. Assessment complete. Pt A&O x4  On special contact precautions fo hep B/A    Vital Signs: BP (!) 96/66   Pulse 75   Temp 36.2 °C (97.2 °F) (Temporal)   Resp 16   Ht 1.575 m (5' 2\")   Wt 54.9 kg (121 lb)   LMP 12/01/2020 (Exact Date)   SpO2 96%   BMI 22.13 kg/m²     Pain: Patient has 0/10 pain. Pain managed with prescribed medications as needed.    Neuro: Denies numbness/tingling    Cardiac: Denies chest pain. Regular heart sounds    Vascular pulses +2 BUE, BLE. No edema noted    Respiratory: Patient denies SOB. On RA. Sating 90%<. Reinforced education on IS.     GI: Patient passing flatus, last BM 12/9. Tolerating regular diet. Denies N/V.    : Patient voiding adequately.    MSK: Patient ambulates with no assist. Bed alarm NA. Patient calling appropriately    Skin: in tact    Discharge Barriers/Plan: Plan to DC home today. Patient requesting FMLA paperwork will pass onto SW    Fall precautions in place. Treaded socks on. Bed locked in lowest position. Call light and personal belongings at bedside within reach. Reinforced use of call light to patient. Hourly rounding in place.  All needs met at this time and POC discussed.     "

## 2020-12-09 NOTE — DISCHARGE SUMMARY
Discharge Summary    CHIEF COMPLAINT ON ADMISSION  Chief Complaint   Patient presents with   • Abdominal Pain     Pt bib ems from [facility]  for an ERCP procedure.        Reason for Admission  Infected Gallbladder and Hepatitis*     Admission Date  12/5/2020    CODE STATUS  Full Code    HPI & HOSPITAL COURSE  This is a 30 y.o. female here with hepatitis A, transferred from outside hospital for possible cholecystitis, surgery and gastroenterology were consulted on the case, cholecystitis has been ruled out. She was covered empirically with IV Zosyn. Work-up showed hepatitis A. Work-up also showed antimitochondrial antibody slightly elevated.  Her ANISHA titer was 1:80.  Her anti-smooth muscle antibody is still pending.  LFTs have been trending down.  Gastroenterology has cleared her for discharge with close follow-up with them as outpatient.    Therefore, she is discharged in good and stable condition to home with close outpatient follow-up.    The patient met 2-midnight criteria for an inpatient stay at the time of discharge.    Discharge Date  12/9/2020    FOLLOW UP ITEMS POST DISCHARGE  Follow-up with gastroenterology as outpatient    DISCHARGE DIAGNOSES  Principal Problem:    Acute hepatitis A POA: Yes  Active Problems:    Thickening of wall of gallbladder POA: Yes    Hypokalemia POA: Yes  Resolved Problems:    * No resolved hospital problems. *      FOLLOW UP  No future appointments.  [facility]ARE[facility]  [id number]  [place] [id number] [phone]  Schedule an appointment as soon as possible for a visit        MEDICATIONS ON DISCHARGE     Medication List      CONTINUE taking these medications      Instructions   fluticasone 50 MCG/ACT nasal spray  Commonly known as: FLONASE   Spray 1 Spray in nose every day. 1 spray into each nostril daily x21d  Dose: 1 Spray        STOP taking these medications    acetaminophen 650 MG CR tablet  Commonly known as: TYLENOL      ibuprofen 200 MG Tabs  Commonly known as: MOTRIN            Allergies  No Known Allergies    DIET  Orders Placed This Encounter   Procedures   • Diet Order Diet: Regular     Standing Status:   Standing     Number of Occurrences:   1     Order Specific Question:   Diet:     Answer:   Regular [1]       ACTIVITY  As tolerated.  Weight bearing as tolerated    CONSULTATIONS  Surgery -[place]  Gastroenterology [facility]    PROCEDURES  None    LABORATORY  Lab Results   Component Value Date    SODIUM 135 12/09/2020    POTASSIUM 3.5 (L) 12/09/2020    CHLORIDE 101 12/09/2020    CO2 30 12/09/2020    GLUCOSE 132 (H) 12/09/2020    BUN 4 (L) 12/09/2020    CREATININE 0.38 (L) 12/09/2020        Lab Results   Component Value Date    WBC 5.3 12/08/2020    HEMOGLOBIN 13.5 12/08/2020    HEMATOCRIT 39.0 12/08/2020    PLATELETCT 261 12/08/2020        Total time of the discharge process exceeds 35 minutes.

## 2020-12-09 NOTE — PROGRESS NOTES
Discharging Patient home per physician order.  Discharged with mom.  Demonstrated understanding of discharge instructions, follow up appointments, home medications, prescriptions, home care for surgical wound, and nursing care instructions for hep A.  Ambulating no assistance, voiding without difficulty, pain well controlled, tolerating oral medications, oxygen saturation greater than 90% , tolerating diet.   Educational handouts  given and discussed.  Verbalized understanding of discharge instructions and educational handouts.  All questions answered.  Belongings with patient at time of discharge.

## 2020-12-09 NOTE — DISCHARGE INSTRUCTIONS
Hepatitis A  Hepatitis A is a liver infection that is caused by a virus. Most cases are mild. Hepatitis A is contagious. This means that it can spread from person to person. You can get this disease by:  · Drinking or eating unclean (contaminated) food or water.  · Having sex with someone who is infected.  · Coming into contact with the poop (feces) of a person who is infected. You may then pass the virus from your hands to your mouth.  Follow these instructions at home:  Medicines  · Take over-the-counter and prescription medicines only as told by your doctor.  · Do not take any over-the-counter medicines that have acetaminophen.  · Do not take any new medicines unless your doctor says that this is okay. This includes over-the-counter medicines and supplements.  Lifestyle    · Rest. Make sure you:  ? Get enough sleep. Avoid staying up late.  ? Keep the same bedtime hours on weekends and weekdays.  ? Take naps or breaks when you feel tired.  · Do not have sex unless your doctor says that this is okay.  · Eat a balanced diet. Eat plenty of:  ? Fruits and vegetables.  ? Whole grains.  ? Low-fat (lean) meats or non-meat proteins, such as beans or tofu.  · Do not drink alcohol until your doctor says that this is okay.  General instructions  · Wash your hands often with soap and water. If you do not have soap and water, use hand . Make sure to wash your hands:  ? After using the bathroom.  ? Before touching food or water.  ? After changing diapers.  · Tell your doctor about people you live with or have close contact with. They may need the hepatitis A vaccine.  · Follow instructions about how to avoid spreading the virus.  · Ask your doctor when you may go back to school or work.  · Keep all follow-up visits as told by your doctor. This is important.  How is this prevented?  · Get the hepatitis A vaccine.  · If you were recently exposed to the virus, you may need a shot of the human immunoglobulin or the  hepatitis A vaccine. This may help protect you against hepatitis A.  · Wash your hands often with soap and water. If you do not have soap and water, use hand . Make sure you wash your hands:  ? After using the bathroom.  ? After changing diapers.  ? Before touching food or water.  · If you travel to a developing country:  ? Avoid food that is raw or undercooked.  ? Drink bottled water.  ? Use bottled water to brush your teeth, make ice, and wash foods.  · Practice safe sex. Always use condoms when having oral, vaginal, or anal sex.  Contact a doctor if:  · You have a fever.  · Your health problems get worse.  Get help right away if:  · You cannot eat or drink.  · You cannot eat or drink without throwing up (vomiting).  · You feel confused.  · You have yellowing of the skin and the whites of your eyes (jaundice) that gets worse.  · You are very sleepy.  · You have trouble waking up.  · You have bleeding that you cannot stop.  · You get a lot of bruises for no reason.  Summary  · Hepatitis A is a liver infection caused by a virus.  · The virus can be spread by eating unclean food or drinking unclean water.  · Do not take any new medicines unless your doctor says that this is okay.  · Wash your hands often with soap and water. This helps to prevent infection.  This information is not intended to replace advice given to you by your health care provider. Make sure you discuss any questions you have with your health care provider.  Document Released: 01/20/2012 Document Revised: 11/30/2018 Document Reviewed: 01/23/2018  [employer] Patient Education © 2020 [employer]        Discharge Instructions    Discharged to home by car with relative. Discharged via wheelchair, hospital escort: Yes.  Special equipment needed: Not Applicable    Be sure to schedule a follow-up appointment with your primary care doctor or any specialists as instructed.     Discharge Plan:   Diet Plan: Discussed  Activity Level:  Discussed  Confirmed Follow up Appointment: Patient to Call and Schedule Appointment  Confirmed Symptoms Management: Discussed  Medication Reconciliation Updated: Yes  Influenza Vaccine Indication: Not indicated: Previously immunized this influenza season and > 8 years of age    I understand that a diet low in cholesterol, fat, and sodium is recommended for good health. Unless I have been given specific instructions below for another diet, I accept this instruction as my diet prescription.       Depression / Suicide Risk    As you are discharged from this [facility] facility, it is important to learn how to keep safe from harming yourself.    Recognize the warning signs:  Abrupt changes in personality, positive or negative- including increase in energy   Giving away possessions  Change in eating patterns- significant weight changes-  positive or negative  Change in sleeping patterns- unable to sleep or sleeping all the time   Unwillingness or inability to communicate  Depression  Unusual sadness, discouragement and loneliness  Talk of wanting to die  Neglect of personal appearance   Rebelliousness- reckless behavior  Withdrawal from people/activities they love  Confusion- inability to concentrate     If you or a loved one observes any of these behaviors or has concerns about self-harm, here's what you can do:  Talk about it- your feelings and reasons for harming yourself  Remove any means that you might use to hurt yourself (examples: pills, rope, extension cords, firearm)  Get professional help from the community (Mental Health, Substance Abuse, psychological counseling)  Do not be alone:Call your Safe Contact- someone whom you trust who will be there for you.  Call your local CRISIS HOTLINE 028-1664 or 122-300-0297  Call your local Children's Mobile Crisis Response Team [place] (577) 069-7330 or www.[employer]quaMost  Call the toll free National Suicide Prevention Hotlines   National Suicide Prevention  Lifeline 242-595-EAND (3720)  [facility] Line Network 800-SUICIDE (798-9358)

## 2020-12-09 NOTE — PROGRESS NOTES
Gastroenterology Progress Note     Author: Matheus Jules M.D.   Date & Time Created: 12/9/2020 9:50 AM    Chief Complaint:  Nausea, Vomiting, Elevated LFTs    History of Present Illness:   This is a 30-year-old female, with no significant past medical history, who presented to the emergency room with worsening nausea/vomiting, myalgias, headaches for the past 9 days.  The nausea and vomiting exacerbates after eating.  Also, she experienced bilateral knee and hip pain at the onset of symptoms.  She was concerned about Covid-which was negative.  Her PCP ran labs showing markedly elevated transaminase.  Evaluated at [facility]-CT scan of the abdomen revealed marked gallbladder wall thickening.  While there, she was evaluated by a general surgeon resident-who told her to come to [facility] for ERCP.  [facility] ER labs-CBC WNL, total bilirubin 6.2.  AST 2012, ALT 1699, alkaline phosphatase 172, lactic acid 1.3.  Hepatitis A positive. Hepatitis B and C-negative.  Infectious disease panel-negative.  Tylenol toxicity- <10.     Abdominal ultrasound 12/5/2020-gallbladder not visualized.  CBD 2 mm.     CT scan abdomen/pelvis 12/5/2020-markedly thickened gallbladder wall.  Diffuse acute cholecystitis.     MRCP 12/6/2020-severe edematous gallbladder with gallbladder wall thickening and collapsing of the lumen of unclear etiology.  No gallstone identified.  Normal CBD.  No CBD stone identified.     Hepatitis A reactive.  She denies any recent travel.  She went to [place] 1 year ago.  No changes in diet.  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     Denies any changes in medications.  No Tylenol use.  Alcohol 1-2 drinks per month.  Never heavy use.  No drug use.  Family members-strong history of gallbladder disease- 3 sisters and 1 brother.  No family history of liver disease.    Interval History:  12/7/2020: Patient is feeling well today without  nausea or vomiting. Her LFTs are stable but not improving. Surgery saw her and thinks gallbladder thickening is due to acute hepatitis and there is no indication for gallbladder removal at this time.     12/8/2020: Patient is stable . LFTs improving, but bilirubin up slightly.   12/09/2020 - Labs still trending down slowly including bilirubin.  No complaints. Tolerating diet.  Eager for discharge.    Review of Systems:  Review of Systems   Constitutional: Negative.  Negative for chills, fever and malaise/fatigue.   HENT: Negative.    Eyes: Negative.    Respiratory: Negative.    Cardiovascular: Negative.    Gastrointestinal: Negative.    Musculoskeletal: Negative.    Skin: Negative.    Neurological: Negative.    Psychiatric/Behavioral: Negative.    All other systems reviewed and are negative.      Physical Exam:  Physical Exam  Vitals signs and nursing note reviewed.   Constitutional:       Appearance: Normal appearance.   HENT:      Head: Normocephalic and atraumatic.      Right Ear: External ear normal.      Left Ear: External ear normal.      Mouth/Throat:      Mouth: Mucous membranes are moist.      Pharynx: Oropharynx is clear.   Eyes:      Extraocular Movements: Extraocular movements intact.      Pupils: Pupils are equal, round, and reactive to light.   Neck:      Musculoskeletal: Neck supple. No neck rigidity or muscular tenderness.   Cardiovascular:      Rate and Rhythm: Normal rate and regular rhythm.      Pulses: Normal pulses.      Heart sounds: Normal heart sounds.   Pulmonary:      Effort: Pulmonary effort is normal.      Breath sounds: Normal breath sounds.   Abdominal:      General: Abdomen is flat. Bowel sounds are normal.      Palpations: Abdomen is soft.   Musculoskeletal: Normal range of motion.         General: No swelling or tenderness.   Skin:     General: Skin is warm and dry.      Capillary Refill: Capillary refill takes less than 2 seconds.   Neurological:      General: No focal deficit  present.      Mental Status: She is alert and oriented to person, place, and time.   Psychiatric:         Mood and Affect: Mood normal.         Thought Content: Thought content normal.         Judgment: Judgment normal.         Labs:          Recent Labs     20   SODIUM 138 137 135   POTASSIUM 3.7 3.4* 3.5*   CHLORIDE 107 103 101   CO2 23 29 30   BUN 4* 4* 4*   CREATININE 0.51 0.45* 0.38*   MAGNESIUM  --   --  2.1   PHOSPHORUS  --   --  3.4   CALCIUM 8.2* 8.8 8.6     Recent Labs     20   ALTSGPT * 1436* 1010*   ASTSGOT * * 484*   ALKPHOSPHAT 101* 101* 101*   TBILIRUBIN 7.2* 8.2* 7.3*   GLUCOSE 92 90 132*     Recent Labs     20  1027 20   RBC  --  4.27 4.37  --    HEMOGLOBIN  --  12.7 13.5  --    HEMATOCRIT  --  38.0 39.0  --    PLATELETCT  --  270 261  --    PROTHROMBTM  --   --   --  15.8*   INR  --   --   --  1.22*   IRON 49 96  --   --    FERRITIN 923.0* 663.0*  --   --    TOTIRONBC 210* 200*  --   --      Recent Labs     20   WBC 4.2* 5.3  --    NEUTSPOLYS 67.80  --   --    LYMPHOCYTES 20.90*  --   --    MONOCYTES 11.30  --   --    EOSINOPHILS 0.00  --   --    BASOPHILS 0.00  --   --    ASTSGOT * * 484*   ALTSGPT * 1436* 1010*   ALKPHOSPHAT 101* 101* 101*   TBILIRUBIN 7.2* 8.2* 7.3*     Hemodynamics:  Temp (24hrs), Av.6 °C (97.9 °F), Min:36.2 °C (97.2 °F), Max:37.1 °C (98.8 °F)  Temperature: 36.2 °C (97.2 °F)  Pulse  Av.8  Min: 60  Max: 108   Blood Pressure: (!) 96/66     Respiratory:    Respiration: 16, Pulse Oximetry: 96 %        RUL Breath Sounds: Clear, RML Breath Sounds: Clear, RLL Breath Sounds: Clear, TAMICA Breath Sounds: Clear, LLL Breath Sounds: Clear  Fluids:    Intake/Output Summary (Last 24 hours) at 2020 0856  Last data filed at 2020 0800  Gross per 24 hour   Intake 2320 ml   Output --    Net 2320 ml        GI/Nutrition:  Orders Placed This Encounter   Procedures   • Diet Order Diet: Regular     Standing Status:   Standing     Number of Occurrences:   1     Order Specific Question:   Diet:     Answer:   Regular [1]     Medical Decision Making, by Problem:  Active Hospital Problems    Diagnosis   • Acute hepatitis A [B15.9]   • Transaminitis [R74.01]   • Thickening of wall of gallbladder [K82.8]       Problems:  1. Nausea and Vomiting: Possibly secondary to acute hepatitis vs cholecystitis. Resolved. No surgery planned per Dr. Herman.    2. 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    3. Elevated LFTs: Stable currently, and likely from Hep A infection. Her ANISHA, ASMA are pending. Ferritin improving. Do no suspect hemochromatosis.    4. Gallbladder wall thickening: felt by surgery to be due to acute hepatitis    Plan:  1. Monitor LFTs weekly as outpatient x 4 weeks  2. Await Autoimmune markers - follow up remaining labs as outpatietn  3. Okay to discharge from GI standpoint - follow up with GI outpatient [phone] in 2-8 weeks     **  GI WILL SIGN OFF / STAND BY - PLEASE CALL IF ANY CONCERNS  **    Quality-Core Measures   Reviewed items::  Labs reviewed, Medications reviewed and Radiology images reviewed

## 2020-12-13 PROBLEM — Z20.822 SUSPECTED COVID-19 VIRUS INFECTION: Status: RESOLVED | Noted: 2020-12-04 | Resolved: 2020-12-13

## 2020-12-13 PROBLEM — U07.1 COVID-19 DETERMINED BY CLINICAL DIAGNOSTIC CRITERIA: Status: RESOLVED | Noted: 2020-12-06 | Resolved: 2020-12-13

## 2020-12-13 PROBLEM — R51.9 NEW ONSET OF HEADACHES: Status: RESOLVED | Noted: 2020-12-04 | Resolved: 2020-12-13

## 2021-02-18 PROBLEM — R05.9 COUGH: Status: RESOLVED | Noted: 2020-12-04 | Resolved: 2021-02-18

## 2021-02-18 PROBLEM — E86.1 HYPOVOLEMIA: Status: RESOLVED | Noted: 2020-12-04 | Resolved: 2021-02-18

## 2021-02-18 PROBLEM — U07.1 COVID-19 VIRUS INFECTION: Status: RESOLVED | Noted: 2020-12-06 | Resolved: 2021-02-18

## 2021-02-18 PROBLEM — E87.6 HYPOKALEMIA: Status: RESOLVED | Noted: 2020-12-08 | Resolved: 2021-02-18

## 2021-02-18 PROBLEM — R11.10 NON-INTRACTABLE VOMITING: Status: RESOLVED | Noted: 2020-12-04 | Resolved: 2021-02-18

## 2021-02-18 PROBLEM — R53.83 FATIGUE: Status: RESOLVED | Noted: 2020-12-04 | Resolved: 2021-02-18

## 2022-01-19 PROBLEM — B15.9 ACUTE HEPATITIS A: Status: RESOLVED | Noted: 2020-12-05 | Resolved: 2022-01-19

## 2022-07-23 PROBLEM — M22.40 CHONDROMALACIA OF PATELLA: Status: ACTIVE | Noted: 2017-01-25

## 2023-07-30 PROBLEM — Z34.02 SUPERVISION OF NORMAL FIRST PREGNANCY IN SECOND TRIMESTER: Status: ACTIVE | Noted: 2023-07-30

## 2023-09-07 PROBLEM — O99.810 ABNORMAL O'SULLIVAN GLUCOSE CHALLENGE TEST, ANTEPARTUM: Status: ACTIVE | Noted: 2023-09-07

## 2023-10-16 PROBLEM — O24.410 DIET CONTROLLED GESTATIONAL DIABETES MELLITUS (GDM): Status: ACTIVE | Noted: 2023-10-16

## 2023-10-16 PROBLEM — O99.810 ABNORMAL O'SULLIVAN GLUCOSE CHALLENGE TEST, ANTEPARTUM: Status: RESOLVED | Noted: 2023-09-07 | Resolved: 2023-10-16

## 2023-11-13 PROBLEM — Z34.83 ENCOUNTER FOR SUPERVISION OF OTHER NORMAL PREGNANCY, THIRD TRIMESTER: Status: ACTIVE | Noted: 2023-07-30

## 2023-12-06 PROBLEM — Z34.90 ENCOUNTER FOR INDUCTION OF LABOR: Status: ACTIVE | Noted: 2023-12-06

## 2023-12-08 PROBLEM — O41.1290 CHORIOAMNIONITIS: Status: ACTIVE | Noted: 2023-12-08

## 2023-12-10 PROBLEM — Z34.90 ENCOUNTER FOR INDUCTION OF LABOR: Status: RESOLVED | Noted: 2023-12-06 | Resolved: 2023-12-10

## 2024-02-12 PROBLEM — Z34.83 ENCOUNTER FOR SUPERVISION OF OTHER NORMAL PREGNANCY, THIRD TRIMESTER: Status: RESOLVED | Noted: 2023-07-30 | Resolved: 2024-02-12

## 2024-02-14 PROBLEM — O24.410 DIET CONTROLLED GESTATIONAL DIABETES MELLITUS (GDM): Status: RESOLVED | Noted: 2023-10-16 | Resolved: 2024-02-14

## 2024-02-14 PROBLEM — M22.40 CHONDROMALACIA OF PATELLA: Status: RESOLVED | Noted: 2017-01-25 | Resolved: 2024-02-14

## 2024-02-14 PROBLEM — O41.1290 CHORIOAMNIONITIS: Status: RESOLVED | Noted: 2023-12-08 | Resolved: 2024-02-14
